# Patient Record
Sex: FEMALE | Race: WHITE | NOT HISPANIC OR LATINO | Employment: OTHER | URBAN - METROPOLITAN AREA
[De-identification: names, ages, dates, MRNs, and addresses within clinical notes are randomized per-mention and may not be internally consistent; named-entity substitution may affect disease eponyms.]

---

## 2017-05-05 ENCOUNTER — HOSPITAL ENCOUNTER (OUTPATIENT)
Dept: MAMMOGRAPHY | Facility: HOSPITAL | Age: 57
Discharge: HOME | End: 2017-05-05
Attending: OBSTETRICS & GYNECOLOGY | Admitting: OBSTETRICS & GYNECOLOGY

## 2017-05-15 ENCOUNTER — HOSPITAL ENCOUNTER (OUTPATIENT)
Dept: URGENT CARE | Facility: CLINIC | Age: 57
Discharge: HOME | End: 2017-05-15
Attending: FAMILY MEDICINE | Admitting: FAMILY MEDICINE

## 2018-05-18 ENCOUNTER — OFFICE (AMBULATORY)
Dept: URBAN - METROPOLITAN AREA PATHOLOGY 4 | Facility: PATHOLOGY | Age: 58
End: 2018-05-18
Payer: OTHER GOVERNMENT

## 2018-05-18 ENCOUNTER — ON CAMPUS - OUTPATIENT (AMBULATORY)
Dept: URBAN - METROPOLITAN AREA HOSPITAL 2 | Facility: HOSPITAL | Age: 58
End: 2018-05-18

## 2018-05-18 VITALS
SYSTOLIC BLOOD PRESSURE: 166 MMHG | SYSTOLIC BLOOD PRESSURE: 143 MMHG | OXYGEN SATURATION: 98 % | HEART RATE: 86 BPM | DIASTOLIC BLOOD PRESSURE: 97 MMHG | DIASTOLIC BLOOD PRESSURE: 75 MMHG | WEIGHT: 126 LBS | SYSTOLIC BLOOD PRESSURE: 128 MMHG | DIASTOLIC BLOOD PRESSURE: 111 MMHG | OXYGEN SATURATION: 97 % | HEIGHT: 60 IN | DIASTOLIC BLOOD PRESSURE: 98 MMHG | HEART RATE: 89 BPM | OXYGEN SATURATION: 99 % | DIASTOLIC BLOOD PRESSURE: 87 MMHG | OXYGEN SATURATION: 100 % | SYSTOLIC BLOOD PRESSURE: 116 MMHG | RESPIRATION RATE: 17 BRPM | RESPIRATION RATE: 14 BRPM | DIASTOLIC BLOOD PRESSURE: 61 MMHG | DIASTOLIC BLOOD PRESSURE: 91 MMHG | SYSTOLIC BLOOD PRESSURE: 158 MMHG | OXYGEN SATURATION: 92 % | TEMPERATURE: 97.4 F | HEART RATE: 94 BPM | RESPIRATION RATE: 16 BRPM | SYSTOLIC BLOOD PRESSURE: 161 MMHG | HEART RATE: 84 BPM | SYSTOLIC BLOOD PRESSURE: 147 MMHG | HEART RATE: 93 BPM | DIASTOLIC BLOOD PRESSURE: 83 MMHG | SYSTOLIC BLOOD PRESSURE: 139 MMHG | OXYGEN SATURATION: 93 % | RESPIRATION RATE: 18 BRPM | SYSTOLIC BLOOD PRESSURE: 178 MMHG | HEART RATE: 102 BPM

## 2018-05-18 DIAGNOSIS — K21.9 GASTRO-ESOPHAGEAL REFLUX DISEASE WITHOUT ESOPHAGITIS: ICD-10-CM

## 2018-05-18 DIAGNOSIS — K64.1 SECOND DEGREE HEMORRHOIDS: ICD-10-CM

## 2018-05-18 DIAGNOSIS — D12.3 BENIGN NEOPLASM OF TRANSVERSE COLON: ICD-10-CM

## 2018-05-18 DIAGNOSIS — Z12.11 ENCOUNTER FOR SCREENING FOR MALIGNANT NEOPLASM OF COLON: ICD-10-CM

## 2018-05-18 DIAGNOSIS — D12.2 BENIGN NEOPLASM OF ASCENDING COLON: ICD-10-CM

## 2018-05-18 DIAGNOSIS — K22.2 ESOPHAGEAL OBSTRUCTION: ICD-10-CM

## 2018-05-18 DIAGNOSIS — K44.9 DIAPHRAGMATIC HERNIA WITHOUT OBSTRUCTION OR GANGRENE: ICD-10-CM

## 2018-05-18 DIAGNOSIS — D12.5 BENIGN NEOPLASM OF SIGMOID COLON: ICD-10-CM

## 2018-05-18 LAB
GI HISTOLOGY: A. UNSPECIFIED: (no result)
GI HISTOLOGY: B. UNSPECIFIED: (no result)
GI HISTOLOGY: C. UNSPECIFIED: (no result)
GI HISTOLOGY: PDF REPORT: (no result)

## 2018-05-18 PROCEDURE — 43235 EGD DIAGNOSTIC BRUSH WASH: CPT | Mod: 59 | Performed by: INTERNAL MEDICINE

## 2018-05-18 PROCEDURE — 45385 COLONOSCOPY W/LESION REMOVAL: CPT | Mod: 33 | Performed by: INTERNAL MEDICINE

## 2018-05-18 PROCEDURE — 88305 TISSUE EXAM BY PATHOLOGIST: CPT | Mod: 33 | Performed by: INTERNAL MEDICINE

## 2018-05-18 RX ADMIN — PROPOFOL: 10 INJECTION, EMULSION INTRAVENOUS at 07:46

## 2018-05-24 ENCOUNTER — HOSPITAL ENCOUNTER (OUTPATIENT)
Dept: MAMMOGRAPHY | Facility: HOSPITAL | Age: 58
Discharge: HOME | End: 2018-05-24
Attending: OBSTETRICS & GYNECOLOGY | Admitting: OBSTETRICS & GYNECOLOGY

## 2018-06-11 ENCOUNTER — HOSPITAL ENCOUNTER (OUTPATIENT)
Dept: MAMMOGRAPHY | Facility: HOSPITAL | Age: 58
Discharge: HOME | End: 2018-06-11
Attending: OBSTETRICS & GYNECOLOGY | Admitting: OBSTETRICS & GYNECOLOGY

## 2019-08-01 ENCOUNTER — TRANSCRIBE ORDERS (OUTPATIENT)
Dept: ADMINISTRATIVE | Facility: HOSPITAL | Age: 59
End: 2019-08-01

## 2019-08-01 DIAGNOSIS — Z12.39 SCREENING BREAST EXAMINATION: Primary | ICD-10-CM

## 2019-10-04 ENCOUNTER — HOSPITAL ENCOUNTER (OUTPATIENT)
Dept: MAMMOGRAPHY | Facility: HOSPITAL | Age: 59
Discharge: HOME | End: 2019-10-04
Admitting: OBSTETRICS & GYNECOLOGY

## 2019-10-04 DIAGNOSIS — Z12.39 SCREENING BREAST EXAMINATION: ICD-10-CM

## 2019-10-04 PROCEDURE — 77067 SCR MAMMO BI INCL CAD: CPT

## 2019-10-04 PROCEDURE — 77063 BREAST TOMOSYNTHESIS BI: CPT

## 2019-11-06 RX ORDER — PANTOPRAZOLE SODIUM 40 MG/1
40 TABLET, DELAYED RELEASE ORAL DAILY
Qty: 14 TABLET | Refills: 0 | Status: SHIPPED | OUTPATIENT
Start: 2019-11-06 | End: 2019-11-12 | Stop reason: SDUPTHER

## 2019-11-06 RX ORDER — METOPROLOL SUCCINATE 25 MG/1
25 TABLET, EXTENDED RELEASE ORAL DAILY
Qty: 14 TABLET | Refills: 0 | Status: SHIPPED | OUTPATIENT
Start: 2019-11-06 | End: 2019-11-12 | Stop reason: SDUPTHER

## 2019-11-06 RX ORDER — GEMFIBROZIL 600 MG/1
600 TABLET, FILM COATED ORAL 2 TIMES DAILY
Qty: 28 TABLET | Refills: 0 | Status: SHIPPED | OUTPATIENT
Start: 2019-11-06 | End: 2019-11-12 | Stop reason: SDUPTHER

## 2019-11-06 RX ORDER — METOPROLOL SUCCINATE 25 MG/1
1 TABLET, EXTENDED RELEASE ORAL DAILY
Refills: 0 | COMMUNITY
Start: 2019-08-08 | End: 2019-11-06 | Stop reason: SDUPTHER

## 2019-11-06 RX ORDER — GEMFIBROZIL 600 MG/1
1 TABLET, FILM COATED ORAL 2 TIMES DAILY
Refills: 0 | COMMUNITY
Start: 2019-08-08 | End: 2019-11-06 | Stop reason: SDUPTHER

## 2019-11-06 RX ORDER — PANTOPRAZOLE SODIUM 40 MG/1
1 TABLET, DELAYED RELEASE ORAL DAILY
Refills: 0 | COMMUNITY
Start: 2019-08-08 | End: 2019-11-06 | Stop reason: SDUPTHER

## 2019-11-12 RX ORDER — GEMFIBROZIL 600 MG/1
600 TABLET, FILM COATED ORAL 2 TIMES DAILY
Qty: 60 TABLET | Refills: 3 | Status: SHIPPED | OUTPATIENT
Start: 2019-11-12 | End: 2020-03-12

## 2019-11-12 RX ORDER — METOPROLOL SUCCINATE 25 MG/1
25 TABLET, EXTENDED RELEASE ORAL DAILY
Qty: 30 TABLET | Refills: 3 | Status: SHIPPED | OUTPATIENT
Start: 2019-11-12 | End: 2020-03-12

## 2019-11-12 RX ORDER — PANTOPRAZOLE SODIUM 40 MG/1
40 TABLET, DELAYED RELEASE ORAL DAILY
Qty: 30 TABLET | Refills: 3 | Status: SHIPPED | OUTPATIENT
Start: 2019-11-12 | End: 2020-03-12

## 2019-12-26 RX ORDER — FOSINOPRIL SODIUM 20 MG/1
TABLET ORAL
Qty: 90 TABLET | Refills: 0 | Status: SHIPPED | OUTPATIENT
Start: 2019-12-26 | End: 2020-03-15

## 2020-02-06 ENCOUNTER — OFFICE VISIT (OUTPATIENT)
Dept: FAMILY MEDICINE CLINIC | Facility: CLINIC | Age: 60
End: 2020-02-06

## 2020-02-06 VITALS
DIASTOLIC BLOOD PRESSURE: 98 MMHG | TEMPERATURE: 98.2 F | HEART RATE: 93 BPM | WEIGHT: 134.8 LBS | HEIGHT: 61 IN | OXYGEN SATURATION: 97 % | BODY MASS INDEX: 25.45 KG/M2 | SYSTOLIC BLOOD PRESSURE: 160 MMHG

## 2020-02-06 DIAGNOSIS — Z00.00 PREVENTATIVE HEALTH CARE: ICD-10-CM

## 2020-02-06 DIAGNOSIS — J30.1 ALLERGIC RHINITIS DUE TO POLLEN, UNSPECIFIED SEASONALITY: ICD-10-CM

## 2020-02-06 DIAGNOSIS — J02.9 SORE THROAT: Primary | ICD-10-CM

## 2020-02-06 DIAGNOSIS — R73.01 FASTING HYPERGLYCEMIA: ICD-10-CM

## 2020-02-06 DIAGNOSIS — E78.5 DYSLIPIDEMIA: ICD-10-CM

## 2020-02-06 DIAGNOSIS — J01.01 ACUTE RECURRENT MAXILLARY SINUSITIS: ICD-10-CM

## 2020-02-06 DIAGNOSIS — R73.09 ELEVATED HEMOGLOBIN A1C: ICD-10-CM

## 2020-02-06 PROBLEM — J06.9 UPPER RESPIRATORY INFECTION, ACUTE: Status: ACTIVE | Noted: 2018-01-04

## 2020-02-06 PROBLEM — B34.9 NONSPECIFIC SYNDROME SUGGESTIVE OF VIRAL ILLNESS: Status: ACTIVE | Noted: 2019-02-15

## 2020-02-06 PROBLEM — I10 HYPERTENSION: Status: ACTIVE | Noted: 2020-02-06

## 2020-02-06 PROBLEM — Z13.1 SCREENING FOR DIABETES MELLITUS: Status: ACTIVE | Noted: 2018-01-04

## 2020-02-06 PROBLEM — M79.604 LEG PAIN, BILATERAL: Status: ACTIVE | Noted: 2018-10-17

## 2020-02-06 PROBLEM — R52 BODY ACHES: Status: ACTIVE | Noted: 2018-01-04

## 2020-02-06 PROBLEM — M79.642 HAND PAIN, LEFT: Status: ACTIVE | Noted: 2017-05-15

## 2020-02-06 PROBLEM — M79.605 LEG PAIN, BILATERAL: Status: ACTIVE | Noted: 2018-10-17

## 2020-02-06 PROBLEM — R05.9 COUGH: Status: ACTIVE | Noted: 2018-01-04

## 2020-02-06 PROBLEM — K21.9 GASTROESOPHAGEAL REFLUX DISEASE: Status: ACTIVE | Noted: 2020-02-06

## 2020-02-06 PROBLEM — J01.90 ACUTE SINUSITIS: Status: ACTIVE | Noted: 2018-01-04

## 2020-02-06 LAB
EXPIRATION DATE: NORMAL
INTERNAL CONTROL: NORMAL
Lab: NORMAL
S PYO AG THROAT QL: NEGATIVE

## 2020-02-06 PROCEDURE — 99213 OFFICE O/P EST LOW 20 MIN: CPT | Performed by: NURSE PRACTITIONER

## 2020-02-06 PROCEDURE — 87880 STREP A ASSAY W/OPTIC: CPT | Performed by: NURSE PRACTITIONER

## 2020-02-06 RX ORDER — PSEUDOEPHEDRINE HCL 30 MG
30 TABLET ORAL EVERY 4 HOURS PRN
Qty: 45 TABLET | Refills: 2 | Status: SHIPPED | OUTPATIENT
Start: 2020-02-06 | End: 2021-09-14

## 2020-02-06 RX ORDER — AZITHROMYCIN 250 MG/1
TABLET, FILM COATED ORAL
Qty: 6 TABLET | Refills: 0 | Status: SHIPPED | OUTPATIENT
Start: 2020-02-06 | End: 2020-04-22

## 2020-02-06 NOTE — PROGRESS NOTES
"    Nimco Mccracken is a 59 y.o. female.      58-year-old white female smoker with history of COPD, hypertension, carpal tunnel, GERD, anemia and anxiety who comes in with four-day history of sore throat I have pressure nose in the ears itching with cough.  Exam reveals severe allergic rhinitis and acute maxillary sinusitis I am placing her on allergy medication and antibiotics   patient had recent eye exam and that was within normal limits with exception of dry eyes   blood pressure 160/88 heart rate 92 she denies any chest pain, dyspnea, tachycardia or dizziness.  Patient states her blood pressure runs much lower at home and she is going to monitor it   weight is up 3 lb at 1:35 a.m.     Z-Tom   Sudafed 30 mg q.i.d. To home allergy meds   fasting blood work due       The following portions of the patient's history were reviewed and updated as appropriate: allergies, current medications, past family history, past medical history, past social history, past surgical history and problem list.    Vitals:    02/06/20 1352   BP: 160/98   BP Location: Left arm   Patient Position: Sitting   Cuff Size: Adult   Pulse: 93   Temp: 98.2 °F (36.8 °C)   TempSrc: Oral   SpO2: 97%   Weight: 61.1 kg (134 lb 12.8 oz)   Height: 154.9 cm (61\")     Body mass index is 25.47 kg/m².    Past Medical History:   Diagnosis Date   • Acute sinusitis    • Acute upper respiratory infection    • Allergic rhinitis due to pollen    • Anxiety    • Body aches    • Carpal tunnel syndrome    • Cigarette smoker    • Cough    • Dysconjugate gaze    • Dysfunction of eustachian tube    • Dyslipidemia    • Dysmenorrhea    • Fasting hyperglycemia    • GERD (gastroesophageal reflux disease)    • Hand pain, left    • Hemorrhoids    • Hx of abnormal cervical Pap smear    • Hypertension    • Irregular menses    • Leg pain, bilateral    • Nodule of upper lobe of right lung     BENIGN   • Paresthesia of both hands    • Post-menopausal    • RAD (reactive airway " disease)    • Skin lesions 06/2007    B9,L BACK,L HIP   • Strabismus    • Trigger finger of right thumb    • Vertigo    • Viral syndrome      Past Surgical History:   Procedure Laterality Date   • CARPAL TUNNEL RELEASE Right 08/2014   • COLONOSCOPY     • ENDOSCOPY  07/21/2006    DR [facility] B9;DR LOZANO     Family History   Problem Relation Age of Onset   • Breast cancer Mother    • Breast cancer Daughter    • Diabetes Other    • Heart disease Other    • Dementia Other    • COPD Other      Immunization History   Administered Date(s) Administered   • Pneumococcal Polysaccharide (PPSV23) 02/21/2014       Conversion Encounter on 05/14/2016   Component Date Value Ref Range Status   • Albumin 05/14/2016 4.6  3.6 - 5.1 g/dL Final   • Alkaline Phosphatase 05/14/2016 82  33 - 130 units/L Final   • Basophils Absolute 05/14/2016 48 CELLS/UL  0 - 200 10*3/mm3 Final   • Basophil Rel % 05/14/2016 0.5  % Final   • Total Bilirubin 05/14/2016 0.4  0.2 - 1.2 mg/dL Final   • BUN 05/14/2016 10  7 - 25 mg/dL Final   • BUN/Creatinine Ratio 05/14/2016 NOT APPLICABLE (calc)  6 - 22 Final   • Calcium 05/14/2016 10.1  8.6 - 10.4 mg/dL Final   • Chloride 05/14/2016 105  98 - 110 mmol/L Final   • Chol/HDL Ratio 05/14/2016 4.5 (calc)  < OR = 5.0 Final   • Total Cholesterol 05/14/2016 203* 125 - 200 mg/dL Final   • CO2 CONTENT  05/14/2016 26  19 - 30 mmol/L Final   • Creatinine 05/14/2016 0.67  0.50 - 1.05 mg/dL Final   • eGFR African Am 05/14/2016 100  > OR = 60 mL/min/1.73m2 Final   • eGFR Non  Am 05/14/2016 116  > OR = 60 mL/min/1.73m2 Final   • Eosinophils Absolute 05/14/2016 115 CELLS/UL  15 - 500 10*3/mm3 Final   • Eosinophil Rel % 05/14/2016 1.2  % Final   • Globulin 05/14/2016 2.9 G/DL (CALC)  1.9 - 3.7 g/dL Final   • Glucose 05/14/2016 91  65 - 99 mg/dL Final   • Hematocrit 05/14/2016 41.2  35.0 - 45.0 % Final   • HDL Cholesterol 05/14/2016 45* > OR = 46 mg/dL Final   • Hemoglobin 05/14/2016 13.7  11.7 - 15.5 g/dL Final    • LDL Cholesterol  05/14/2016 122 MG/DL (CALC)  <130 mg/dL Final   • Lymphocytes Absolute 05/14/2016 3830 CELLS/UL  850 - 3900 10*3/mm3 Final   • Lymphocyte Rel % 05/14/2016 39.9  % Final   • MCH 05/14/2016 29.4  27.0 - 33.0 pg Final   • MCHC 05/14/2016 33.2 G/DL  32.0 - 36.0 % Final   • MCV 05/14/2016 88.5  80.0 - 100.0 fL Final   • Monocyte Rel % 05/14/2016 4.7  % Final   • Monocytes Absolute 05/14/2016 451 CELLS/UL  200 - 950 10*3/microliter Final   • MPV 05/14/2016 7.9  7.5 - 11.5 fL Final   • Neutrophils Absolute 05/14/2016 5155 CELLS/UL  1500 - 7800 10*3/mm3 Final   • Platelets 05/14/2016 311 THOUSAND/UL  140 - 400 10*3/mm3 Final   • Neutrophils, Fluid 05/14/2016 53.7  % Final   • Potassium 05/14/2016 4.2  3.5 - 5.3 mmol/L Final   • Total Protein 05/14/2016 7.5  6.1 - 8.1 g/dL Final   • RBC 05/14/2016 4.66 MILLION/UL  3.80 - 5.10 10*6/mm3 Final   • RDW 05/14/2016 13.6  11.0 - 15.0 % Final   • AST (SGOT) 05/14/2016 14  10 - 35 units/L Final   • ALT (SGPT) 05/14/2016 13  6 - 29 units/L Final   • Sodium 05/14/2016 139  135 - 146 mmol/L Final   • Triglycerides 05/14/2016 181* <150 mg/dL Final   • WBC 05/14/2016 9.6 THOUSAND/UL  3.8 - 10.8 K/uL Final   • A/G Ratio 05/14/2016 1.6 (calc)  1.0 - 2.5 Final   • Albumin 05/20/2017 4.3  3.6 - 5.1 g/dL Final   • Alkaline Phosphatase 05/20/2017 77  33 - 130 units/L Final   • Vitamin B-12 05/20/2017 334  200 - 1100 pg/mL Final   • Basophils Absolute 05/20/2017 26 CELLS/UL  0 - 200 10*3/mm3 Final   • Basophil Rel % 05/20/2017 0.2  % Final   • Total Bilirubin 05/20/2017 0.3  0.2 - 1.2 mg/dL Final   • BUN 05/20/2017 16  7 - 25 mg/dL Final   • BUN/Creatinine Ratio 05/20/2017 NOT APPLICABLE (calc)  6 - 22 Final   • Calcium 05/20/2017 9.9  8.6 - 10.4 mg/dL Final   • Chloride 05/20/2017 106  98 - 110 mmol/L Final   • Chol/HDL Ratio 05/20/2017 3.7 (calc)  < OR = 5.0 Final   • Total Cholesterol 05/20/2017 163  125 - 200 mg/dL Final   • CO2 CONTENT  05/20/2017 26  20 - 31 mmol/L  Final   • Creatinine 05/20/2017 0.64  0.50 - 1.05 mg/dL Final   • eGFR African Am 05/20/2017 101  > OR = 60 mL/min/1.73m2 Final   • eGFR Non  Am 05/20/2017 117  > OR = 60 mL/min/1.73m2 Final   • Eosinophils Absolute 05/20/2017 39 CELLS/UL  15 - 500 10*3/mm3 Final   • Eosinophil Rel % 05/20/2017 0.3  % Final   • Globulin 05/20/2017 2.5 G/DL (CALC)  1.9 - 3.7 g/dL Final   • Glucose 05/20/2017 80  65 - 99 mg/dL Final   • Hematocrit 05/20/2017 38.4  35.0 - 45.0 % Final   • HDL Cholesterol 05/20/2017 44* > OR = 46 mg/dL Final   • Hemoglobin 05/20/2017 12.6  11.7 - 15.5 g/dL Final   • LDL Cholesterol  05/20/2017 78 MG/DL (CALC)  <130 mg/dL Final   • Lymphocytes Absolute 05/20/2017 5746 CELLS/UL* 850 - 3900 10*3/mm3 Final   • Lymphocyte Rel % 05/20/2017 44.2  % Final   • MCH 05/20/2017 28.9  27.0 - 33.0 pg Final   • MCHC 05/20/2017 32.8 G/DL  32.0 - 36.0 % Final   • MCV 05/20/2017 88.1  80.0 - 100.0 fL Final   • Monocyte Rel % 05/20/2017 5.4  % Final   • Monocytes Absolute 05/20/2017 702 CELLS/UL  200 - 950 10*3/microliter Final   • MPV 05/20/2017 10.0  7.5 - 12.5 fL Final   • Neutrophils Absolute 05/20/2017 6487 CELLS/UL  1500 - 7800 10*3/mm3 Final   • Platelets 05/20/2017 355 THOUSAND/UL  140 - 400 10*3/mm3 Final   • Neutrophils, Fluid 05/20/2017 49.9  % Final   • Potassium 05/20/2017 3.9  3.5 - 5.3 mmol/L Final   • Total Protein 05/20/2017 6.8  6.1 - 8.1 g/dL Final   • RBC 05/20/2017 4.36 MILLION/UL  3.80 - 5.10 10*6/mm3 Final   • RDW 05/20/2017 12.6  11.0 - 15.0 % Final   • AST (SGOT) 05/20/2017 12  10 - 35 units/L Final   • ALT (SGPT) 05/20/2017 10  6 - 29 units/L Final   • Sodium 05/20/2017 140  135 - 146 mmol/L Final   • Triglycerides 05/20/2017 206* <150 mg/dL Final   • TSH 05/20/2017 1.58  mIU/L Final   • WBC 05/20/2017 13.0 THOUSAND/UL* 3.8 - 10.8 K/uL Final   • A/G Ratio 05/20/2017 1.7 (calc)  1.0 - 2.5 Final   • Albumin 01/19/2018 4.2  3.6 - 5.1 g/dL Final   • Alkaline Phosphatase 01/19/2018 80  33 - 130  units/L Final   • Basophils Absolute 01/19/2018 100 CELLS/UL  0 - 200 10*3/mm3 Final   • Basophil Rel % 01/19/2018 1  % Final   • Glucose 01/19/2018 105* 65 - 99 mg/dL Final   • Total Bilirubin 01/19/2018 0.6  0.2 - 1.2 mg/dL Final   • BUN 01/19/2018 5* 7 - 25 mg/dL Final   • BUN/Creatinine Ratio 01/19/2018 7.1 (calc)  6 - 22 Final   • Calcium 01/19/2018 10.1  8.6 - 10.2 mg/dL Final   • Chloride 01/19/2018 105  98 - 110 mmol/L Final   • Chol/HDL Ratio 01/19/2018 3.9 (calc)  <5.1 Final   • Total Cholesterol 01/19/2018 193  125 - 200 mg/dL Final   • CO2 01/19/2018 26  21 - 33 mmol/L Final   • Creatinine 01/19/2018 0.7  0.60 - 1.10 mg/dL Final   • eGFR African Am 01/19/2018 >60 mL/min/1.73m2  >59 mL/min/1.73m2 Final   • eGFR Non African Am 01/19/2018 >60 mL/min/1.73m2  >59 mL/min/1.73m2 Final   • Eosinophils Absolute 01/19/2018 100 CELLS/UL  15 - 500 10*3/mm3 Final   • Eosinophil Rel % 01/19/2018 1  % Final   • Globulin 01/19/2018 2.8 G/DL (CALC)  2.2 - 3.9 g/dL Final   • Hematocrit 01/19/2018 41.6  35.0 - 45.0 % Final   • HDL Cholesterol 01/19/2018 50  >46 mg/dL Final   • Hemoglobin 01/19/2018 14.0  11.7 - 15.5 g/dL Final   • % Hgb A1C 01/19/2018 6.3 % OF TOTAL HGB* <5.7 % Final   • LDL Cholesterol  01/19/2018 113  <130 mg/dL Final   • Lymphocytes Absolute 01/19/2018 3100 CELLS/UL  850 - 3900 10*3/mm3 Final   • Lymphocyte Rel % 01/19/2018 33  % Final   • MCH 01/19/2018 30.1  27.0 - 33.0 pg Final   • MCHC 01/19/2018 33.6 G/DL  32.0 - 36.0 % Final   • MCV 01/19/2018 89.7  80.0 - 100.0 fL Final   • Monocyte Rel % 01/19/2018 5  % Final   • Monocytes Absolute 01/19/2018 500 CELLS/UL  200 - 950 10*3/microliter Final   • MPV 01/19/2018 8.3  7.5 - 11.5 fL Final   • Neutrophils Absolute 01/19/2018 5600 CELLS/UL  1500 - 7800 10*3/mm3 Final   • Platelets 01/19/2018 339 THOUSAND/UL  140 - 400 10*3/mm3 Final   • Neutrophils, Fluid 01/19/2018 60  % Final   • Potassium 01/19/2018 5.0  3.5 - 5.3 mmol/L Final   • Total Protein  01/19/2018 7.0  6.2 - 8.3 g/dL Final   • RBC 01/19/2018 4.64 MILLION/UL  3.80 - 5.10 10*6/mm3 Final   • RDW 01/19/2018 13.2  11.0 - 15.0 % Final   • AST (SGOT) 01/19/2018 16  10 - 35 units/L Final   • ALT (SGPT) 01/19/2018 15  6 - 40 units/L Final   • Sodium 01/19/2018 139  135 - 146 mmol/L Final   • Triglycerides 01/19/2018 150* <150 mg/dL Final   • WBC 01/19/2018 9.3 THOUSAND/UL  3.8 - 10.8 10*3/mm3 Final   • A/G Ratio 01/19/2018 1.5 (calc)  1.0 - 2.1 Final   • Albumin 03/08/2019 4.6  3.6 - 5.1 g/dL Final   • Alkaline Phosphatase 03/08/2019 102  33 - 130 units/L Final   • Basophils Absolute 03/08/2019 41 CELLS/UL  0 - 200 10*3/mm3 Final   • Basophil Rel % 03/08/2019 0.5  % Final   • Total Bilirubin 03/08/2019 0.4  0.2 - 1.2 mg/dL Final   • BUN 03/08/2019 11  7 - 25 mg/dL Final   • BUN/Creatinine Ratio 03/08/2019 NOT APPLICABLE (calc)  6 - 22 Final   • Calcium 03/08/2019 9.9  8.6 - 10.4 mg/dL Final   • Chloride 03/08/2019 107  98 - 110 mmol/L Final   • Chol/HDL Ratio 03/08/2019 4.1 (calc)  <5.0 Final   • Total Cholesterol 03/08/2019 215* <200 mg/dL Final   • CO2 CONTENT  03/08/2019 24  20 - 32 mmol/L Final   • Creatinine 03/08/2019 0.69  0.50 - 1.05 mg/dL Final   • eGFR African Am 03/08/2019 97  > OR = 60 mL/min/1.73m2 Final   • eGFR Non African Am 03/08/2019 113  > OR = 60 mL/min/1.73m2 Final   • Eosinophils Absolute 03/08/2019 148 CELLS/UL  15 - 500 10*3/mm3 Final   • Eosinophil Rel % 03/08/2019 1.8  % Final   • Globulin 03/08/2019 2.7 G/DL (CALC)  1.9 - 3.7 g/dL Final   • Glucose 03/08/2019 85  65 - 99 mg/dL Final   • Hematocrit 03/08/2019 36.0  35.0 - 45.0 % Final   • HDL Cholesterol 03/08/2019 53  >50 mg/dL Final   • Hemoglobin 03/08/2019 12.4  11.7 - 15.5 g/dL Final   • % Hgb A1C 03/08/2019 6.0 % OF TOTAL HGB* <5.7 % Final   • LDL Cholesterol  03/08/2019 147 MG/DL (CALC)* mg/dL Final   • Lymphocytes Absolute 03/08/2019 3862 CELLS/UL  850 - 3900 10*3/mm3 Final   • Lymphocyte Rel % 03/08/2019 47.1  % Final   •  MCH 03/08/2019 29.6  27.0 - 33.0 pg Final   • MCHC 03/08/2019 34.4 G/DL  32.0 - 36.0 % Final   • MCV 03/08/2019 85.9  80.0 - 100.0 fL Final   • Monocyte Rel % 03/08/2019 7.2  % Final   • Monocytes Absolute 03/08/2019 590 CELLS/UL  200 - 950 10*3/microliter Final   • MPV 03/08/2019 10.3  7.5 - 12.5 fL Final   • Neutrophils Absolute 03/08/2019 3559 CELLS/UL  1500 - 7800 10*3/mm3 Final   • Platelets 03/08/2019 356 THOUSAND/UL  140 - 400 10*3/mm3 Final   • Neutrophils, Fluid 03/08/2019 43.4  % Final   • Potassium 03/08/2019 4.3  3.5 - 5.3 mmol/L Final   • Total Protein 03/08/2019 7.3  6.1 - 8.1 g/dL Final   • RBC 03/08/2019 4.19 MILLION/UL  3.80 - 5.10 10*6/mm3 Final   • RDW 03/08/2019 12.9  11.0 - 15.0 % Final   • AST (SGOT) 03/08/2019 16  10 - 35 units/L Final   • ALT (SGPT) 03/08/2019 14  6 - 29 units/L Final   • Sodium 03/08/2019 139  135 - 146 mmol/L Final   • Triglycerides 03/08/2019 60  <150 mg/dL Final   • WBC 03/08/2019 8.2 THOUSAND/UL  3.8 - 10.8 K/uL Final   • A/G Ratio 03/08/2019 1.7 (calc)  1.0 - 2.5 Final         Review of Systems   Constitutional: Positive for fatigue.   HENT: Positive for postnasal drip, rhinorrhea, sinus pressure and sore throat.    Respiratory: Positive for cough.    Cardiovascular: Negative.    Gastrointestinal: Negative.    Genitourinary: Negative.    Musculoskeletal: Negative.    Skin: Negative.    Neurological: Negative.    Psychiatric/Behavioral: Negative.        Objective   Physical Exam   Constitutional: She is oriented to person, place, and time. She appears well-developed and well-nourished.   HENT:   Heavy postnasal drip with swollen red terminates bulging TMs and maxillary sinus pressure   Cardiovascular: Normal rate and regular rhythm.   Pulmonary/Chest: Effort normal and breath sounds normal.   Abdominal: Soft. Bowel sounds are normal.   Musculoskeletal: Normal range of motion.   Neurological: She is alert and oriented to person, place, and time.   Skin: Skin is warm and  dry.   Psychiatric: She has a normal mood and affect.       Procedures    Assessment/Plan   Nimco was seen today for sore throat.    Diagnoses and all orders for this visit:    Sore throat  -     POCT rapid strep A    Fasting hyperglycemia  -     Comprehensive Metabolic Panel    Dyslipidemia  -     Lipid Panel With LDL / HDL Ratio    Preventative health care  -     CBC & Differential    Elevated hemoglobin A1c  -     Hemoglobin A1c    Acute recurrent maxillary sinusitis    Allergic rhinitis due to pollen, unspecified seasonality    Other orders  -     azithromycin (ZITHROMAX Z-LUAN) 250 MG tablet; Take 2 tablets the first day, then 1 tablet daily for 4 days.  -     pseudoephedrine (SUDAFED) 30 MG tablet; Take 1 tablet by mouth Every 4 (Four) Hours As Needed for Congestion.          Current Outpatient Medications:   •  fosinopril (MONOPRIL) 20 MG tablet, take 1 tablet by mouth once daily, Disp: 90 tablet, Rfl: 0  •  gemfibrozil (LOPID) 600 MG tablet, Take 1 tablet by mouth 2 (Two) Times a Day., Disp: 60 tablet, Rfl: 3  •  metoprolol succinate XL (TOPROL-XL) 25 MG 24 hr tablet, Take 1 tablet by mouth Daily., Disp: 30 tablet, Rfl: 3  •  pantoprazole (PROTONIX) 40 MG EC tablet, Take 1 tablet by mouth Daily., Disp: 30 tablet, Rfl: 3  •  azithromycin (ZITHROMAX Z-LUAN) 250 MG tablet, Take 2 tablets the first day, then 1 tablet daily for 4 days., Disp: 6 tablet, Rfl: 0  •  pseudoephedrine (SUDAFED) 30 MG tablet, Take 1 tablet by mouth Every 4 (Four) Hours As Needed for Congestion., Disp: 45 tablet, Rfl: 2

## 2020-03-12 RX ORDER — PANTOPRAZOLE SODIUM 40 MG/1
TABLET, DELAYED RELEASE ORAL
Qty: 30 TABLET | Refills: 3 | Status: SHIPPED | OUTPATIENT
Start: 2020-03-12 | End: 2020-08-03

## 2020-03-12 RX ORDER — GEMFIBROZIL 600 MG/1
TABLET, FILM COATED ORAL
Qty: 60 TABLET | Refills: 3 | Status: SHIPPED | OUTPATIENT
Start: 2020-03-12 | End: 2020-08-03

## 2020-03-12 RX ORDER — METOPROLOL SUCCINATE 25 MG/1
TABLET, EXTENDED RELEASE ORAL
Qty: 30 TABLET | Refills: 3 | Status: SHIPPED | OUTPATIENT
Start: 2020-03-12 | End: 2020-07-07

## 2020-03-14 LAB
ALBUMIN SERPL-MCNC: 4.9 G/DL (ref 3.8–4.9)
ALBUMIN/GLOB SERPL: 1.8 {RATIO} (ref 1.2–2.2)
ALP SERPL-CCNC: 110 IU/L (ref 39–117)
ALT SERPL-CCNC: 12 IU/L (ref 0–32)
AST SERPL-CCNC: 15 IU/L (ref 0–40)
BASOPHILS # BLD AUTO: 0.1 X10E3/UL (ref 0–0.2)
BASOPHILS NFR BLD AUTO: 1 %
BILIRUB SERPL-MCNC: 0.3 MG/DL (ref 0–1.2)
BUN SERPL-MCNC: 15 MG/DL (ref 6–24)
BUN/CREAT SERPL: 16 (ref 9–23)
CALCIUM SERPL-MCNC: 10.3 MG/DL (ref 8.7–10.2)
CHLORIDE SERPL-SCNC: 105 MMOL/L (ref 96–106)
CHOLEST SERPL-MCNC: 212 MG/DL (ref 100–199)
CO2 SERPL-SCNC: 24 MMOL/L (ref 20–29)
CREAT SERPL-MCNC: 0.92 MG/DL (ref 0.57–1)
EOSINOPHIL # BLD AUTO: 0.2 X10E3/UL (ref 0–0.4)
EOSINOPHIL NFR BLD AUTO: 2 %
ERYTHROCYTE [DISTWIDTH] IN BLOOD BY AUTOMATED COUNT: 12.8 % (ref 11.7–15.4)
GLOBULIN SER CALC-MCNC: 2.7 G/DL (ref 1.5–4.5)
GLUCOSE SERPL-MCNC: 88 MG/DL (ref 65–99)
HBA1C MFR BLD: 6.1 % (ref 4.8–5.6)
HCT VFR BLD AUTO: 41.8 % (ref 34–46.6)
HDLC SERPL-MCNC: 56 MG/DL
HGB BLD-MCNC: 13.9 G/DL (ref 11.1–15.9)
IMM GRANULOCYTES # BLD AUTO: 0 X10E3/UL (ref 0–0.1)
IMM GRANULOCYTES NFR BLD AUTO: 0 %
LDLC SERPL CALC-MCNC: 148 MG/DL (ref 0–99)
LDLC/HDLC SERPL: 2.6 RATIO (ref 0–3.2)
LYMPHOCYTES # BLD AUTO: 3 X10E3/UL (ref 0.7–3.1)
LYMPHOCYTES NFR BLD AUTO: 39 %
MCH RBC QN AUTO: 29.4 PG (ref 26.6–33)
MCHC RBC AUTO-ENTMCNC: 33.3 G/DL (ref 31.5–35.7)
MCV RBC AUTO: 88 FL (ref 79–97)
MONOCYTES # BLD AUTO: 0.6 X10E3/UL (ref 0.1–0.9)
MONOCYTES NFR BLD AUTO: 8 %
NEUTROPHILS # BLD AUTO: 3.9 X10E3/UL (ref 1.4–7)
NEUTROPHILS NFR BLD AUTO: 50 %
PLATELET # BLD AUTO: 472 X10E3/UL (ref 150–450)
POTASSIUM SERPL-SCNC: 4.6 MMOL/L (ref 3.5–5.2)
PROT SERPL-MCNC: 7.6 G/DL (ref 6–8.5)
RBC # BLD AUTO: 4.73 X10E6/UL (ref 3.77–5.28)
SODIUM SERPL-SCNC: 145 MMOL/L (ref 134–144)
TRIGL SERPL-MCNC: 41 MG/DL (ref 0–149)
VLDLC SERPL CALC-MCNC: 8 MG/DL (ref 5–40)
WBC # BLD AUTO: 7.7 X10E3/UL (ref 3.4–10.8)

## 2020-03-15 RX ORDER — FOSINOPRIL SODIUM 20 MG/1
TABLET ORAL
Qty: 90 TABLET | Refills: 0 | Status: SHIPPED | OUTPATIENT
Start: 2020-03-15 | End: 2020-03-30

## 2020-03-30 RX ORDER — FOSINOPRIL SODIUM 20 MG/1
TABLET ORAL
Qty: 90 TABLET | Refills: 0 | Status: SHIPPED | OUTPATIENT
Start: 2020-03-30 | End: 2020-04-01 | Stop reason: SDUPTHER

## 2020-04-01 RX ORDER — FOSINOPRIL SODIUM 20 MG/1
20 TABLET ORAL DAILY
Qty: 90 TABLET | Refills: 2 | Status: SHIPPED | OUTPATIENT
Start: 2020-04-01 | End: 2020-08-24 | Stop reason: SDUPTHER

## 2020-04-20 ENCOUNTER — TELEPHONE (OUTPATIENT)
Dept: FAMILY MEDICINE CLINIC | Facility: CLINIC | Age: 60
End: 2020-04-20

## 2020-04-20 NOTE — TELEPHONE ENCOUNTER
Pt called and said she is really worried about going back to work.  Its making her anxiety go crazy.  She works at [employer] in [place].  She is just panicking about going abck.  Didn't know if there was something she could do or what you thought.      I told pt there was not much we could do but I would talk to you about it.  SG

## 2020-04-22 ENCOUNTER — TELEMEDICINE (OUTPATIENT)
Dept: FAMILY MEDICINE CLINIC | Facility: CLINIC | Age: 60
End: 2020-04-22

## 2020-04-22 DIAGNOSIS — F17.210 CIGARETTE SMOKER: ICD-10-CM

## 2020-04-22 DIAGNOSIS — F41.9 ANXIETY: ICD-10-CM

## 2020-04-22 DIAGNOSIS — I10 ESSENTIAL HYPERTENSION: Primary | ICD-10-CM

## 2020-04-22 DIAGNOSIS — J68.3 REACTIVE AIRWAYS DYSFUNCTION SYNDROME (HCC): ICD-10-CM

## 2020-04-22 PROCEDURE — 99213 OFFICE O/P EST LOW 20 MIN: CPT | Performed by: NURSE PRACTITIONER

## 2020-04-22 NOTE — PROGRESS NOTES
Nimco Mccracken is a 59 y.o. female.     You have chosen to receive care through a telehealth visit.  Do you consent to use a video/audio connection for your medical care today? Yes    59-year-old obese white female smoker with history of COPD, hypertension, carpal tunnel, GERD, anemia and anxiety with elevated A1c's who calls in today for video visit.  Patient's main reason for call is anxiety about going back to work with her history of COPD hypertension and borderline  diabetes.  I agreed to write her a note asking the employer to allow her to stay home due to her medical conditions however cannot guarantee that they will honor it.  Patient is not sure if she would lose her job if she continues to refuse to go back to work.  We discussed possibly going on Lexapro or Zoloft if patient does return to work  Patient knows her blood pressure is elevated it was 160/88 last time she was here however she cannot get a cuff to work and she is going to try to find a way to monitor her blood pressures and call the office.  She denies any chest pain, dyspnea, tachycardia or dizziness  Patient's last A1c was 6.1 with a glucose fasting of 90 and both of her siblings are diabetics.  Patient admits to eating a lot of sweets since the quarantine out of anxiety and boredom and we agreed to do fasting blood work in June with her next appointment.  Patient does not check blood sugars at home  Patient colonoscopy coming due next year she is up-to-date on eye exams and we will schedule her mammogram and DEXA scan in October of this year.  She goes to OB/GYN for her Pap smears    Work note  Fasting office visit here in June  Schedule mammogram and DEXA scan this fall  Make appointment with OB/GYN for Pap smear  Stay on diet as discussed  Monitor blood pressures             The following portions of the patient's history were reviewed and updated as appropriate: allergies, current medications, past family history, past medical  history, past social history, past surgical history and problem list.    There were no vitals filed for this visit.  There is no height or weight on file to calculate BMI.    Past Medical History:   Diagnosis Date   • Acute sinusitis    • Acute upper respiratory infection    • Allergic rhinitis due to pollen    • Anxiety    • Body aches    • Carpal tunnel syndrome    • Cigarette smoker    • Cough    • Dysconjugate gaze    • Dysfunction of eustachian tube    • Dyslipidemia    • Dysmenorrhea    • Fasting hyperglycemia    • GERD (gastroesophageal reflux disease)    • Hand pain, left    • Hemorrhoids    • Hx of abnormal cervical Pap smear    • Hypertension    • Irregular menses    • Leg pain, bilateral    • Nodule of upper lobe of right lung     BENIGN   • Paresthesia of both hands    • Post-menopausal    • RAD (reactive airway disease)    • Skin lesions 06/2007    B9,L BACK,L HIP   • Strabismus    • Trigger finger of right thumb    • Vertigo    • Viral syndrome      Past Surgical History:   Procedure Laterality Date   • CARPAL TUNNEL RELEASE Right 08/2014   • COLONOSCOPY     • ENDOSCOPY  07/21/2006    DR [facility] B9;DR LOZANO     Family History   Problem Relation Age of Onset   • Breast cancer Mother    • Breast cancer Daughter    • Diabetes Other    • Heart disease Other    • Dementia Other    • COPD Other      Immunization History   Administered Date(s) Administered   • Pneumococcal Polysaccharide (PPSV23) 02/21/2014       Office Visit on 02/06/2020   Component Date Value Ref Range Status   • Rapid Strep A Screen 02/06/2020 Negative  Negative, VALID, INVALID, Not Performed Final   • Internal Control 02/06/2020 Passed  Passed Final   • Lot Number 02/06/2020 [id number]   Final   • Expiration Date 02/06/2020 06/30/2020   Final   • WBC 03/13/2020 7.7  3.4 - 10.8 x10E3/uL Final   • RBC 03/13/2020 4.73  3.77 - 5.28 x10E6/uL Final   • Hemoglobin 03/13/2020 13.9  11.1 - 15.9 g/dL Final   • Hematocrit 03/13/2020 41.8   34.0 - 46.6 % Final   • MCV 03/13/2020 88  79 - 97 fL Final   • MCH 03/13/2020 29.4  26.6 - 33.0 pg Final   • MCHC 03/13/2020 33.3  31.5 - 35.7 g/dL Final   • RDW 03/13/2020 12.8  11.7 - 15.4 % Final   • Platelets 03/13/2020 472* 150 - 450 x10E3/uL Final   • Neutrophil Rel % 03/13/2020 50  Not Estab. % Final   • Lymphocyte Rel % 03/13/2020 39  Not Estab. % Final   • Monocyte Rel % 03/13/2020 8  Not Estab. % Final   • Eosinophil Rel % 03/13/2020 2  Not Estab. % Final   • Basophil Rel % 03/13/2020 1  Not Estab. % Final   • Neutrophils Absolute 03/13/2020 3.9  1.4 - 7.0 x10E3/uL Final   • Lymphocytes Absolute 03/13/2020 3.0  0.7 - 3.1 x10E3/uL Final   • Monocytes Absolute 03/13/2020 0.6  0.1 - 0.9 x10E3/uL Final   • Eosinophils Absolute 03/13/2020 0.2  0.0 - 0.4 x10E3/uL Final   • Basophils Absolute 03/13/2020 0.1  0.0 - 0.2 x10E3/uL Final   • Immature Granulocyte Rel % 03/13/2020 0  Not Estab. % Final   • Immature Grans Absolute 03/13/2020 0.0  0.0 - 0.1 x10E3/uL Final   • Total Cholesterol 03/13/2020 212* 100 - 199 mg/dL Final   • Triglycerides 03/13/2020 41  0 - 149 mg/dL Final   • HDL Cholesterol 03/13/2020 56  >39 mg/dL Final   • VLDL Cholesterol 03/13/2020 8  5 - 40 mg/dL Final   • LDL Cholesterol  03/13/2020 148* 0 - 99 mg/dL Final   • LDL/HDL Ratio 03/13/2020 2.6  0.0 - 3.2 ratio Final    Comment:                                     LDL/HDL Ratio                                              Men  Women                                1/2 Avg.Risk  1.0    1.5                                    Avg.Risk  3.6    3.2                                 2X Avg.Risk  6.2    5.0                                 3X Avg.Risk  8.0    6.1     • Glucose 03/13/2020 88  65 - 99 mg/dL Final   • BUN 03/13/2020 15  6 - 24 mg/dL Final   • Creatinine 03/13/2020 0.92  0.57 - 1.00 mg/dL Final   • eGFR Non African Am 03/13/2020 69  >59 mL/min/1.73 Final   • eGFR African Am 03/13/2020 80  >59 mL/min/1.73 Final   • BUN/Creatinine Ratio  03/13/2020 16  9 - 23 Final   • Sodium 03/13/2020 145* 134 - 144 mmol/L Final   • Potassium 03/13/2020 4.6  3.5 - 5.2 mmol/L Final   • Chloride 03/13/2020 105  96 - 106 mmol/L Final   • Total CO2 03/13/2020 24  20 - 29 mmol/L Final   • Calcium 03/13/2020 10.3* 8.7 - 10.2 mg/dL Final   • Total Protein 03/13/2020 7.6  6.0 - 8.5 g/dL Final   • Albumin 03/13/2020 4.9  3.8 - 4.9 g/dL Final   • Globulin 03/13/2020 2.7  1.5 - 4.5 g/dL Final   • A/G Ratio 03/13/2020 1.8  1.2 - 2.2 Final   • Total Bilirubin 03/13/2020 0.3  0.0 - 1.2 mg/dL Final   • Alkaline Phosphatase 03/13/2020 110  39 - 117 IU/L Final   • AST (SGOT) 03/13/2020 15  0 - 40 IU/L Final   • ALT (SGPT) 03/13/2020 12  0 - 32 IU/L Final   • Hemoglobin A1C 03/13/2020 6.1* 4.8 - 5.6 % Final    Comment:          Prediabetes: 5.7 - 6.4           Diabetes: >6.4           Glycemic control for adults with diabetes: <7.0           Review of Systems   Constitutional: Negative.    HENT: Negative.    Respiratory: Negative.    Cardiovascular: Negative.    Gastrointestinal: Negative.    Genitourinary: Negative.    Musculoskeletal: Negative.    Skin: Negative.    Neurological: Negative.    Psychiatric/Behavioral: The patient is nervous/anxious.        Objective   Physical Exam   Constitutional: She is oriented to person, place, and time. She appears well-developed and well-nourished.   Cardiovascular: Normal rate and regular rhythm.   Per patient   Pulmonary/Chest: Effort normal.   Per patient   Abdominal: Soft. Bowel sounds are normal.   Musculoskeletal: Normal range of motion.   Neurological: She is oriented to person, place, and time.   Skin: Skin is warm.   Psychiatric:   Increased anxiety over going back to work due to medical conditions       Procedures    Assessment/Plan   Nimco was seen today for anxiety.    Diagnoses and all orders for this visit:    Essential hypertension    Anxiety    Cigarette smoker    Reactive airways dysfunction syndrome  (CMS/[facility])          Current Outpatient Medications:   •  fosinopril (MONOPRIL) 20 MG tablet, Take 1 tablet by mouth Daily., Disp: 90 tablet, Rfl: 2  •  gemfibrozil (LOPID) 600 MG tablet, TAKE 1 TABLET BY MOUTH TWICE A DAY, Disp: 60 tablet, Rfl: 3  •  metoprolol succinate XL (TOPROL-XL) 25 MG 24 hr tablet, TAKE 1 TABLET BY MOUTH ONCE DAILY, Disp: 30 tablet, Rfl: 3  •  pantoprazole (PROTONIX) 40 MG EC tablet, TAKE 1 TABLET BY MOUTH ONCE DAILY, Disp: 30 tablet, Rfl: 3  •  pseudoephedrine (SUDAFED) 30 MG tablet, Take 1 tablet by mouth Every 4 (Four) Hours As Needed for Congestion., Disp: 45 tablet, Rfl: 2          not viewed

## 2020-04-22 NOTE — PATIENT INSTRUCTIONS
Pickup note for work  Fasting appointment in June for blood work  Schedule colonoscopy mammogram and DEXA scan this fall  Diet and exercise as discussed for weight and elevated A1c  Monitor blood pressures at home and call the office

## 2020-07-07 RX ORDER — METOPROLOL SUCCINATE 25 MG/1
TABLET, EXTENDED RELEASE ORAL
Qty: 90 TABLET | Refills: 1 | Status: SHIPPED | OUTPATIENT
Start: 2020-07-07 | End: 2020-12-30 | Stop reason: SDUPTHER

## 2020-08-03 ENCOUNTER — TELEPHONE (OUTPATIENT)
Dept: FAMILY MEDICINE CLINIC | Facility: CLINIC | Age: 60
End: 2020-08-03

## 2020-08-03 RX ORDER — GEMFIBROZIL 600 MG/1
TABLET, FILM COATED ORAL
Qty: 60 TABLET | Refills: 3 | Status: SHIPPED | OUTPATIENT
Start: 2020-08-03 | End: 2020-12-03 | Stop reason: SDUPTHER

## 2020-08-03 RX ORDER — PANTOPRAZOLE SODIUM 40 MG/1
TABLET, DELAYED RELEASE ORAL
Qty: 30 TABLET | Refills: 3 | Status: SHIPPED | OUTPATIENT
Start: 2020-08-03 | End: 2020-11-25 | Stop reason: SDUPTHER

## 2020-08-24 ENCOUNTER — OFFICE VISIT (OUTPATIENT)
Dept: FAMILY MEDICINE CLINIC | Facility: CLINIC | Age: 60
End: 2020-08-24

## 2020-08-24 VITALS
DIASTOLIC BLOOD PRESSURE: 90 MMHG | HEIGHT: 61 IN | TEMPERATURE: 96.9 F | BODY MASS INDEX: 25.3 KG/M2 | HEART RATE: 96 BPM | WEIGHT: 134 LBS | OXYGEN SATURATION: 98 % | RESPIRATION RATE: 18 BRPM | SYSTOLIC BLOOD PRESSURE: 142 MMHG

## 2020-08-24 DIAGNOSIS — F17.210 CIGARETTE SMOKER: ICD-10-CM

## 2020-08-24 DIAGNOSIS — Z12.31 OTHER SCREENING MAMMOGRAM: ICD-10-CM

## 2020-08-24 DIAGNOSIS — I10 ESSENTIAL HYPERTENSION: Primary | ICD-10-CM

## 2020-08-24 DIAGNOSIS — L03.031 CELLULITIS OF TOE OF RIGHT FOOT: ICD-10-CM

## 2020-08-24 DIAGNOSIS — Z00.00 PREVENTATIVE HEALTH CARE: ICD-10-CM

## 2020-08-24 DIAGNOSIS — B35.1 NAIL FUNGUS: ICD-10-CM

## 2020-08-24 PROCEDURE — 99214 OFFICE O/P EST MOD 30 MIN: CPT | Performed by: NURSE PRACTITIONER

## 2020-08-24 RX ORDER — FOSINOPRIL SODIUM 20 MG/1
TABLET ORAL
Qty: 135 TABLET | Refills: 0 | Status: SHIPPED | OUTPATIENT
Start: 2020-08-24 | End: 2020-10-06

## 2020-08-24 RX ORDER — SULFAMETHOXAZOLE AND TRIMETHOPRIM 800; 160 MG/1; MG/1
1 TABLET ORAL 2 TIMES DAILY
Qty: 20 TABLET | Refills: 0 | Status: SHIPPED | OUTPATIENT
Start: 2020-08-24 | End: 2020-08-26 | Stop reason: ALTCHOICE

## 2020-08-24 NOTE — PROGRESS NOTES
"    Nimco Mccracken is a 60 y.o. female.     60-year-old white female smoker with history of COPD, hypertension, carpal tunnel, GERD, anemia and anxiety who comes in today with complaints of right greater toe pain.  Toe has heavy fungus and nail bed on greater toe and second toe are severely compromised.  Right greater toe is red and swollen I am placing her on antibiotics and referring her to podiatry  Blood pressure 142/90 heart rate 96 she denies any chest pain, dyspnea, tachycardia or dizziness.  Patient is on metoprolol and Monopril I am increasing her Monopril to 30 mg and gave her parameters in which to check blood pressure with  Weight is stable at 134  We are scheduling her mammogram she is going to call her insurance company and see if DEXA scan is covered and she is going to schedule an eye exam.  She is up-to-date on colonoscopy at this time    Bactrim DS twice daily #20  Podiatry referral  Mammogram at [facility] visit       The following portions of the patient's history were reviewed and updated as appropriate: allergies, current medications, past family history, past medical history, past social history, past surgical history and problem list.    Vitals:    08/24/20 1507   BP: 142/90   BP Location: Left arm   Patient Position: Sitting   Cuff Size: Adult   Pulse: 96   Resp: 18   Temp: 96.9 °F (36.1 °C)   TempSrc: Temporal   SpO2: 98%   Weight: 60.8 kg (134 lb)   Height: 154.9 cm (61\")     Body mass index is 25.32 kg/m².    Past Medical History:   Diagnosis Date   • Acute sinusitis    • Acute upper respiratory infection    • Allergic rhinitis due to pollen    • Anxiety    • Body aches    • Carpal tunnel syndrome    • Cigarette smoker    • Cough    • Dysconjugate gaze    • Dysfunction of eustachian tube    • Dyslipidemia    • Dysmenorrhea    • Fasting hyperglycemia    • GERD (gastroesophageal reflux disease)    • Hand pain, left    • Hemorrhoids    • Hx of abnormal cervical Pap smear  "   • Hypertension    • Irregular menses    • Leg pain, bilateral    • Nodule of upper lobe of right lung     BENIGN   • Paresthesia of both hands    • Post-menopausal    • RAD (reactive airway disease)    • Skin lesions 06/2007    B9,L BACK,L HIP   • Strabismus    • Trigger finger of right thumb    • Vertigo    • Viral syndrome      Past Surgical History:   Procedure Laterality Date   • CARPAL TUNNEL RELEASE Right 08/2014   • COLONOSCOPY     • ENDOSCOPY  07/21/2006    DR [facility] B9;DR LOZANO     Family History   Problem Relation Age of Onset   • Breast cancer Mother    • Breast cancer Daughter    • Diabetes Other    • Heart disease Other    • Dementia Other    • COPD Other      Immunization History   Administered Date(s) Administered   • Pneumococcal Polysaccharide (PPSV23) 02/21/2014       Office Visit on 02/06/2020   Component Date Value Ref Range Status   • Rapid Strep A Screen 02/06/2020 Negative  Negative, VALID, INVALID, Not Performed Final   • Internal Control 02/06/2020 Passed  Passed Final   • Lot Number 02/06/2020 [id number]   Final   • Expiration Date 02/06/2020 06/30/2020   Final   • WBC 03/13/2020 7.7  3.4 - 10.8 x10E3/uL Final   • RBC 03/13/2020 4.73  3.77 - 5.28 x10E6/uL Final   • Hemoglobin 03/13/2020 13.9  11.1 - 15.9 g/dL Final   • Hematocrit 03/13/2020 41.8  34.0 - 46.6 % Final   • MCV 03/13/2020 88  79 - 97 fL Final   • MCH 03/13/2020 29.4  26.6 - 33.0 pg Final   • MCHC 03/13/2020 33.3  31.5 - 35.7 g/dL Final   • RDW 03/13/2020 12.8  11.7 - 15.4 % Final   • Platelets 03/13/2020 472* 150 - 450 x10E3/uL Final   • Neutrophil Rel % 03/13/2020 50  Not Estab. % Final   • Lymphocyte Rel % 03/13/2020 39  Not Estab. % Final   • Monocyte Rel % 03/13/2020 8  Not Estab. % Final   • Eosinophil Rel % 03/13/2020 2  Not Estab. % Final   • Basophil Rel % 03/13/2020 1  Not Estab. % Final   • Neutrophils Absolute 03/13/2020 3.9  1.4 - 7.0 x10E3/uL Final   • Lymphocytes Absolute 03/13/2020 3.0  0.7 - 3.1  x10E3/uL Final   • Monocytes Absolute 03/13/2020 0.6  0.1 - 0.9 x10E3/uL Final   • Eosinophils Absolute 03/13/2020 0.2  0.0 - 0.4 x10E3/uL Final   • Basophils Absolute 03/13/2020 0.1  0.0 - 0.2 x10E3/uL Final   • Immature Granulocyte Rel % 03/13/2020 0  Not Estab. % Final   • Immature Grans Absolute 03/13/2020 0.0  0.0 - 0.1 x10E3/uL Final   • Total Cholesterol 03/13/2020 212* 100 - 199 mg/dL Final   • Triglycerides 03/13/2020 41  0 - 149 mg/dL Final   • HDL Cholesterol 03/13/2020 56  >39 mg/dL Final   • VLDL Cholesterol 03/13/2020 8  5 - 40 mg/dL Final   • LDL Cholesterol  03/13/2020 148* 0 - 99 mg/dL Final   • LDL/HDL Ratio 03/13/2020 2.6  0.0 - 3.2 ratio Final    Comment:                                     LDL/HDL Ratio                                              Men  Women                                1/2 Avg.Risk  1.0    1.5                                    Avg.Risk  3.6    3.2                                 2X Avg.Risk  6.2    5.0                                 3X Avg.Risk  8.0    6.1     • Glucose 03/13/2020 88  65 - 99 mg/dL Final   • BUN 03/13/2020 15  6 - 24 mg/dL Final   • Creatinine 03/13/2020 0.92  0.57 - 1.00 mg/dL Final   • eGFR Non African Am 03/13/2020 69  >59 mL/min/1.73 Final   • eGFR African Am 03/13/2020 80  >59 mL/min/1.73 Final   • BUN/Creatinine Ratio 03/13/2020 16  9 - 23 Final   • Sodium 03/13/2020 145* 134 - 144 mmol/L Final   • Potassium 03/13/2020 4.6  3.5 - 5.2 mmol/L Final   • Chloride 03/13/2020 105  96 - 106 mmol/L Final   • Total CO2 03/13/2020 24  20 - 29 mmol/L Final   • Calcium 03/13/2020 10.3* 8.7 - 10.2 mg/dL Final   • Total Protein 03/13/2020 7.6  6.0 - 8.5 g/dL Final   • Albumin 03/13/2020 4.9  3.8 - 4.9 g/dL Final   • Globulin 03/13/2020 2.7  1.5 - 4.5 g/dL Final   • A/G Ratio 03/13/2020 1.8  1.2 - 2.2 Final   • Total Bilirubin 03/13/2020 0.3  0.0 - 1.2 mg/dL Final   • Alkaline Phosphatase 03/13/2020 110  39 - 117 IU/L Final   • AST (SGOT) 03/13/2020 15  0 - 40 IU/L  Final   • ALT (SGPT) 03/13/2020 12  0 - 32 IU/L Final   • Hemoglobin A1C 03/13/2020 6.1* 4.8 - 5.6 % Final    Comment:          Prediabetes: 5.7 - 6.4           Diabetes: >6.4           Glycemic control for adults with diabetes: <7.0           Review of Systems   Constitutional: Negative.    HENT: Negative.    Respiratory: Negative.    Cardiovascular: Negative.    Gastrointestinal: Negative.    Genitourinary: Negative.    Musculoskeletal: Negative.    Skin:        Right greater toe red and painful   Neurological: Negative.    Psychiatric/Behavioral: Negative.        Objective   Physical Exam   Constitutional: She is oriented to person, place, and time. She appears well-developed and well-nourished.   Cardiovascular: Normal rate and regular rhythm.   Pulmonary/Chest: Effort normal and breath sounds normal.   Abdominal: Soft. Bowel sounds are normal.   Musculoskeletal: Normal range of motion.   Neurological: She is alert and oriented to person, place, and time.   Skin: Skin is warm and dry.   Right greater toe very painful around nail bed and on nail itself with heavy fungal growth and nails lifting off of skin   Psychiatric: She has a normal mood and affect.       Procedures    Assessment/Plan   Nimco was seen today for toe pain.    Diagnoses and all orders for this visit:    Essential hypertension    Cigarette smoker    Cellulitis of toe of right foot    Nail fungus    BMI 25.0-25.9,adult    Other orders  -     fosinopril (MONOPRIL) 20 MG tablet; 1 1/2 tabs daily          Current Outpatient Medications:   •  fosinopril (MONOPRIL) 20 MG tablet, 1 1/2 tabs daily, Disp: 135 tablet, Rfl: 0  •  gemfibrozil (LOPID) 600 MG tablet, TAKE 1 TABLET BY MOUTH TWICE DAILY, Disp: 60 tablet, Rfl: 3  •  metoprolol succinate XL (TOPROL-XL) 25 MG 24 hr tablet, TAKE 1 TABLET BY MOUTH EVERY DAY, Disp: 90 tablet, Rfl: 1  •  pantoprazole (PROTONIX) 40 MG EC tablet, TAKE 1 TABLET BY MOUTH EVERY DAY, Disp: 30 tablet, Rfl: 3  •   pseudoephedrine (SUDAFED) 30 MG tablet, Take 1 tablet by mouth Every 4 (Four) Hours As Needed for Congestion., Disp: 45 tablet, Rfl: 2

## 2020-08-24 NOTE — PATIENT INSTRUCTIONS
Take antibiotic as directed with food until gone  Keep appointment with podiatry  Take 1/2 tablets of Monopril daily equaling 30 mg  Monitor blood pressure according to parameters given  Keep appointment for mammogram  Follow-up fasting visit in September

## 2020-08-26 ENCOUNTER — TELEPHONE (OUTPATIENT)
Dept: FAMILY MEDICINE CLINIC | Facility: CLINIC | Age: 60
End: 2020-08-26

## 2020-08-26 RX ORDER — DOXYCYCLINE 100 MG/1
100 CAPSULE ORAL 2 TIMES DAILY
Qty: 20 CAPSULE | Refills: 0 | Status: SHIPPED | OUTPATIENT
Start: 2020-08-26 | End: 2020-10-06

## 2020-08-26 NOTE — TELEPHONE ENCOUNTER
Pt called and states she cant take the abx you rxd due to her being allergic to sulfa and it has that in it. Wants a different abx if you can.

## 2020-10-06 ENCOUNTER — OFFICE VISIT (OUTPATIENT)
Dept: FAMILY MEDICINE CLINIC | Facility: CLINIC | Age: 60
End: 2020-10-06

## 2020-10-06 VITALS
OXYGEN SATURATION: 98 % | WEIGHT: 133 LBS | HEIGHT: 61 IN | BODY MASS INDEX: 25.11 KG/M2 | SYSTOLIC BLOOD PRESSURE: 150 MMHG | DIASTOLIC BLOOD PRESSURE: 90 MMHG | HEART RATE: 89 BPM | TEMPERATURE: 97.1 F

## 2020-10-06 DIAGNOSIS — I10 ESSENTIAL HYPERTENSION: ICD-10-CM

## 2020-10-06 DIAGNOSIS — F17.210 CIGARETTE SMOKER: ICD-10-CM

## 2020-10-06 DIAGNOSIS — R73.01 FASTING HYPERGLYCEMIA: Primary | ICD-10-CM

## 2020-10-06 DIAGNOSIS — Z00.00 PREVENTATIVE HEALTH CARE: ICD-10-CM

## 2020-10-06 DIAGNOSIS — E78.5 DYSLIPIDEMIA: ICD-10-CM

## 2020-10-06 DIAGNOSIS — Z23 NEED FOR 23-POLYVALENT PNEUMOCOCCAL POLYSACCHARIDE VACCINE: ICD-10-CM

## 2020-10-06 PROCEDURE — 90471 IMMUNIZATION ADMIN: CPT | Performed by: NURSE PRACTITIONER

## 2020-10-06 PROCEDURE — 99213 OFFICE O/P EST LOW 20 MIN: CPT | Performed by: NURSE PRACTITIONER

## 2020-10-06 PROCEDURE — 90732 PPSV23 VACC 2 YRS+ SUBQ/IM: CPT | Performed by: NURSE PRACTITIONER

## 2020-10-06 RX ORDER — FOSINOPRIL SODIUM 40 MG/1
40 TABLET ORAL DAILY
Qty: 90 TABLET | Refills: 1 | Status: SHIPPED | OUTPATIENT
Start: 2020-10-06 | End: 2021-04-04

## 2020-10-06 NOTE — PROGRESS NOTES
"    Nimco Mccracken is a 60 y.o. female.     60-year-old white female smoker with history of COPD, hypertension, carpal tunnel, GERD, anemia, and anxiety who comes in today for follow-up visit  Pressure 148/82 heart rate 88 she denies any chest pain, dyspnea, tachycardia or dizziness  Patient  has heavy nail fungus and toe pain and has an appointment with podiatry next week  Weight is down 1 pound at 133 with a BMI of 25.1  Her last A1c was 6.1 cholesterol 212 and we are doing blood work  Patient needs to make an eye exam she has mammogram and Pap smear scheduled.  She declines colonoscopy at this time    Fasting blood work  Keep appointment for mammogram and Pap smear  Schedule eye exam  Pneumonia shot today  Follow-up 6 months       The following portions of the patient's history were reviewed and updated as appropriate: allergies, current medications, past family history, past medical history, past social history, past surgical history and problem list.    Vitals:    10/06/20 0805   BP: 150/90   BP Location: Left arm   Patient Position: Sitting   Cuff Size: Adult   Pulse: 89   Temp: 97.1 °F (36.2 °C)   TempSrc: Temporal   SpO2: 98%   Weight: 60.3 kg (133 lb)   Height: 154.9 cm (61\")     Body mass index is 25.13 kg/m².    Past Medical History:   Diagnosis Date   • Acute sinusitis    • Acute upper respiratory infection    • Allergic rhinitis due to pollen    • Anxiety    • Body aches    • Carpal tunnel syndrome    • Cigarette smoker    • Cough    • Dysconjugate gaze    • Dysfunction of eustachian tube    • Dyslipidemia    • Dysmenorrhea    • Fasting hyperglycemia    • GERD (gastroesophageal reflux disease)    • Hand pain, left    • Hemorrhoids    • Hx of abnormal cervical Pap smear    • Hypertension    • Irregular menses    • Leg pain, bilateral    • Nodule of upper lobe of right lung     BENIGN   • Paresthesia of both hands    • Post-menopausal    • RAD (reactive airway disease)    • Skin lesions 06/2007    " B9,L BACK,L HIP   • Strabismus    • Trigger finger of right thumb    • Vertigo    • Viral syndrome      Past Surgical History:   Procedure Laterality Date   • CARPAL TUNNEL RELEASE Right 08/2014   • COLONOSCOPY     • ENDOSCOPY  07/21/2006    DR [facility] B9;DR LOZANO     Family History   Problem Relation Age of Onset   • Breast cancer Mother    • Breast cancer Daughter    • Diabetes Other    • Heart disease Other    • Dementia Other    • COPD Other      Immunization History   Administered Date(s) Administered   • Pneumococcal Polysaccharide (PPSV23) 02/21/2014, 10/06/2020       Office Visit on 02/06/2020   Component Date Value Ref Range Status   • Rapid Strep A Screen 02/06/2020 Negative  Negative, VALID, INVALID, Not Performed Final   • Internal Control 02/06/2020 Passed  Passed Final   • Lot Number 02/06/2020 [id number]   Final   • Expiration Date 02/06/2020 06/30/2020   Final   • WBC 03/13/2020 7.7  3.4 - 10.8 x10E3/uL Final   • RBC 03/13/2020 4.73  3.77 - 5.28 x10E6/uL Final   • Hemoglobin 03/13/2020 13.9  11.1 - 15.9 g/dL Final   • Hematocrit 03/13/2020 41.8  34.0 - 46.6 % Final   • MCV 03/13/2020 88  79 - 97 fL Final   • MCH 03/13/2020 29.4  26.6 - 33.0 pg Final   • MCHC 03/13/2020 33.3  31.5 - 35.7 g/dL Final   • RDW 03/13/2020 12.8  11.7 - 15.4 % Final   • Platelets 03/13/2020 472* 150 - 450 x10E3/uL Final   • Neutrophil Rel % 03/13/2020 50  Not Estab. % Final   • Lymphocyte Rel % 03/13/2020 39  Not Estab. % Final   • Monocyte Rel % 03/13/2020 8  Not Estab. % Final   • Eosinophil Rel % 03/13/2020 2  Not Estab. % Final   • Basophil Rel % 03/13/2020 1  Not Estab. % Final   • Neutrophils Absolute 03/13/2020 3.9  1.4 - 7.0 x10E3/uL Final   • Lymphocytes Absolute 03/13/2020 3.0  0.7 - 3.1 x10E3/uL Final   • Monocytes Absolute 03/13/2020 0.6  0.1 - 0.9 x10E3/uL Final   • Eosinophils Absolute 03/13/2020 0.2  0.0 - 0.4 x10E3/uL Final   • Basophils Absolute 03/13/2020 0.1  0.0 - 0.2 x10E3/uL Final   • Immature  Granulocyte Rel % 03/13/2020 0  Not Estab. % Final   • Immature Grans Absolute 03/13/2020 0.0  0.0 - 0.1 x10E3/uL Final   • Total Cholesterol 03/13/2020 212* 100 - 199 mg/dL Final   • Triglycerides 03/13/2020 41  0 - 149 mg/dL Final   • HDL Cholesterol 03/13/2020 56  >39 mg/dL Final   • VLDL Cholesterol 03/13/2020 8  5 - 40 mg/dL Final   • LDL Cholesterol  03/13/2020 148* 0 - 99 mg/dL Final   • LDL/HDL Ratio 03/13/2020 2.6  0.0 - 3.2 ratio Final    Comment:                                     LDL/HDL Ratio                                              Men  Women                                1/2 Avg.Risk  1.0    1.5                                    Avg.Risk  3.6    3.2                                 2X Avg.Risk  6.2    5.0                                 3X Avg.Risk  8.0    6.1     • Glucose 03/13/2020 88  65 - 99 mg/dL Final   • BUN 03/13/2020 15  6 - 24 mg/dL Final   • Creatinine 03/13/2020 0.92  0.57 - 1.00 mg/dL Final   • eGFR Non African Am 03/13/2020 69  >59 mL/min/1.73 Final   • eGFR African Am 03/13/2020 80  >59 mL/min/1.73 Final   • BUN/Creatinine Ratio 03/13/2020 16  9 - 23 Final   • Sodium 03/13/2020 145* 134 - 144 mmol/L Final   • Potassium 03/13/2020 4.6  3.5 - 5.2 mmol/L Final   • Chloride 03/13/2020 105  96 - 106 mmol/L Final   • Total CO2 03/13/2020 24  20 - 29 mmol/L Final   • Calcium 03/13/2020 10.3* 8.7 - 10.2 mg/dL Final   • Total Protein 03/13/2020 7.6  6.0 - 8.5 g/dL Final   • Albumin 03/13/2020 4.9  3.8 - 4.9 g/dL Final   • Globulin 03/13/2020 2.7  1.5 - 4.5 g/dL Final   • A/G Ratio 03/13/2020 1.8  1.2 - 2.2 Final   • Total Bilirubin 03/13/2020 0.3  0.0 - 1.2 mg/dL Final   • Alkaline Phosphatase 03/13/2020 110  39 - 117 IU/L Final   • AST (SGOT) 03/13/2020 15  0 - 40 IU/L Final   • ALT (SGPT) 03/13/2020 12  0 - 32 IU/L Final   • Hemoglobin A1C 03/13/2020 6.1* 4.8 - 5.6 % Final    Comment:          Prediabetes: 5.7 - 6.4           Diabetes: >6.4           Glycemic control for adults with  diabetes: <7.0           Review of Systems   Constitutional: Negative.    HENT: Negative.    Respiratory: Negative.    Cardiovascular: Negative.    Genitourinary: Negative.    Musculoskeletal: Negative.    Skin: Negative.    Neurological: Negative.    Psychiatric/Behavioral: Negative.        Objective   Physical Exam  Constitutional:       Appearance: Normal appearance.   HENT:      Head: Normocephalic.   Neck:      Musculoskeletal: Normal range of motion.   Cardiovascular:      Rate and Rhythm: Normal rate and regular rhythm.   Pulmonary:      Effort: Pulmonary effort is normal.      Breath sounds: Normal breath sounds.   Musculoskeletal: Normal range of motion.   Skin:     General: Skin is warm and dry.   Neurological:      General: No focal deficit present.      Mental Status: She is alert and oriented to person, place, and time.   Psychiatric:         Mood and Affect: Mood normal.         Procedures    Assessment/Plan   Problems Addressed this Visit        Cardiovascular and Mediastinum    Hypertension    Relevant Medications    fosinopril (MONOPRIL) 40 MG tablet       Other    Cigarette smoker    Dyslipidemia    Relevant Orders    Lipid Panel With LDL / HDL Ratio    Fasting hyperglycemia - Primary    Relevant Orders    Comprehensive Metabolic Panel    Hemoglobin A1c      Other Visit Diagnoses     Preventative health care        Relevant Orders    CBC & Differential    Need for 23-polyvalent pneumococcal polysaccharide vaccine        Relevant Orders    Pneumococcal Polysaccharide Vaccine 23-Valent (PPSV23) Greater Than or Equal To 3yo Subcutaneous / IM (Completed)    BMI 25.0-25.9,adult          Diagnoses       Codes Comments    Fasting hyperglycemia    -  Primary ICD-10-CM: R73.01  ICD-9-CM: 790.21     Dyslipidemia     ICD-10-CM: E78.5  ICD-9-CM: 272.4     Preventative health care     ICD-10-CM: Z00.00  ICD-9-CM: V70.0     Need for 23-polyvalent pneumococcal polysaccharide vaccine     ICD-10-CM: Z23  ICD-9-CM:  V03.82     Essential hypertension     ICD-10-CM: I10  ICD-9-CM: 401.9     Cigarette smoker     ICD-10-CM: F17.210  ICD-9-CM: 305.1     BMI 25.0-25.9,adult     ICD-10-CM: Z68.25  ICD-9-CM: V85.21             Current Outpatient Medications:   •  gemfibrozil (LOPID) 600 MG tablet, TAKE 1 TABLET BY MOUTH TWICE DAILY, Disp: 60 tablet, Rfl: 3  •  metoprolol succinate XL (TOPROL-XL) 25 MG 24 hr tablet, TAKE 1 TABLET BY MOUTH EVERY DAY, Disp: 90 tablet, Rfl: 1  •  pantoprazole (PROTONIX) 40 MG EC tablet, TAKE 1 TABLET BY MOUTH EVERY DAY, Disp: 30 tablet, Rfl: 3  •  pseudoephedrine (SUDAFED) 30 MG tablet, Take 1 tablet by mouth Every 4 (Four) Hours As Needed for Congestion., Disp: 45 tablet, Rfl: 2  •  fosinopril (MONOPRIL) 40 MG tablet, Take 1 tablet by mouth Daily., Disp: 90 tablet, Rfl: 1

## 2020-10-06 NOTE — PATIENT INSTRUCTIONS
Fasting blood work  Schedule eye exam  Pneumonia shot today  Keep appointment for mammogram and Pap smear  Follow-up 6 months

## 2020-10-07 ENCOUNTER — HOSPITAL ENCOUNTER (OUTPATIENT)
Dept: MAMMOGRAPHY | Facility: HOSPITAL | Age: 60
Discharge: HOME | End: 2020-10-07
Admitting: NURSE PRACTITIONER

## 2020-10-07 DIAGNOSIS — Z12.31 OTHER SCREENING MAMMOGRAM: ICD-10-CM

## 2020-10-07 LAB
ALBUMIN SERPL-MCNC: 4.4 G/DL (ref 3.8–4.9)
ALBUMIN/GLOB SERPL: 1.4 {RATIO} (ref 1.2–2.2)
ALP SERPL-CCNC: 138 IU/L (ref 39–117)
ALT SERPL-CCNC: 10 IU/L (ref 0–32)
AST SERPL-CCNC: 15 IU/L (ref 0–40)
BASOPHILS # BLD AUTO: 0.1 X10E3/UL (ref 0–0.2)
BASOPHILS NFR BLD AUTO: 1 %
BILIRUB SERPL-MCNC: 0.2 MG/DL (ref 0–1.2)
BUN SERPL-MCNC: 10 MG/DL (ref 6–24)
BUN/CREAT SERPL: 14 (ref 9–23)
CALCIUM SERPL-MCNC: 10.3 MG/DL (ref 8.7–10.2)
CHLORIDE SERPL-SCNC: 105 MMOL/L (ref 96–106)
CHOLEST SERPL-MCNC: 207 MG/DL (ref 100–199)
CO2 SERPL-SCNC: 24 MMOL/L (ref 20–29)
CREAT SERPL-MCNC: 0.71 MG/DL (ref 0.57–1)
EOSINOPHIL # BLD AUTO: 0.1 X10E3/UL (ref 0–0.4)
EOSINOPHIL NFR BLD AUTO: 2 %
ERYTHROCYTE [DISTWIDTH] IN BLOOD BY AUTOMATED COUNT: 12.6 % (ref 11.7–15.4)
GLOBULIN SER CALC-MCNC: 3.2 G/DL (ref 1.5–4.5)
GLUCOSE SERPL-MCNC: 99 MG/DL (ref 65–99)
HBA1C MFR BLD: 6.3 % (ref 4.8–5.6)
HCT VFR BLD AUTO: 42.1 % (ref 34–46.6)
HDLC SERPL-MCNC: 49 MG/DL
HGB BLD-MCNC: 13.9 G/DL (ref 11.1–15.9)
IMM GRANULOCYTES # BLD AUTO: 0 X10E3/UL (ref 0–0.1)
IMM GRANULOCYTES NFR BLD AUTO: 0 %
LDLC SERPL CALC-MCNC: 140 MG/DL (ref 0–99)
LDLC/HDLC SERPL: 2.9 RATIO (ref 0–3.2)
LYMPHOCYTES # BLD AUTO: 2.9 X10E3/UL (ref 0.7–3.1)
LYMPHOCYTES NFR BLD AUTO: 37 %
MCH RBC QN AUTO: 28.7 PG (ref 26.6–33)
MCHC RBC AUTO-ENTMCNC: 33 G/DL (ref 31.5–35.7)
MCV RBC AUTO: 87 FL (ref 79–97)
MONOCYTES # BLD AUTO: 0.6 X10E3/UL (ref 0.1–0.9)
MONOCYTES NFR BLD AUTO: 8 %
NEUTROPHILS # BLD AUTO: 4.2 X10E3/UL (ref 1.4–7)
NEUTROPHILS NFR BLD AUTO: 52 %
PLATELET # BLD AUTO: 439 X10E3/UL (ref 150–450)
POTASSIUM SERPL-SCNC: 4.6 MMOL/L (ref 3.5–5.2)
PROT SERPL-MCNC: 7.6 G/DL (ref 6–8.5)
RBC # BLD AUTO: 4.84 X10E6/UL (ref 3.77–5.28)
SODIUM SERPL-SCNC: 143 MMOL/L (ref 134–144)
TRIGL SERPL-MCNC: 102 MG/DL (ref 0–149)
VLDLC SERPL CALC-MCNC: 18 MG/DL (ref 5–40)
WBC # BLD AUTO: 7.9 X10E3/UL (ref 3.4–10.8)

## 2020-10-07 PROCEDURE — 77063 BREAST TOMOSYNTHESIS BI: CPT

## 2020-10-07 PROCEDURE — 77067 SCR MAMMO BI INCL CAD: CPT

## 2020-10-08 ENCOUNTER — TELEPHONE (OUTPATIENT)
Dept: FAMILY MEDICINE CLINIC | Facility: CLINIC | Age: 60
End: 2020-10-08

## 2020-10-19 DIAGNOSIS — Z78.0 POSTMENOPAUSAL: Primary | ICD-10-CM

## 2020-10-28 ENCOUNTER — TELEPHONE (OUTPATIENT)
Dept: FAMILY MEDICINE CLINIC | Facility: CLINIC | Age: 60
End: 2020-10-28

## 2020-11-06 ENCOUNTER — HOSPITAL ENCOUNTER (OUTPATIENT)
Dept: BONE DENSITY | Facility: HOSPITAL | Age: 60
Discharge: HOME | End: 2020-11-06
Admitting: NURSE PRACTITIONER

## 2020-11-06 DIAGNOSIS — Z78.0 POSTMENOPAUSAL: ICD-10-CM

## 2020-11-06 PROCEDURE — 77080 DXA BONE DENSITY AXIAL: CPT

## 2020-11-25 RX ORDER — PANTOPRAZOLE SODIUM 40 MG/1
40 TABLET, DELAYED RELEASE ORAL DAILY
Qty: 30 TABLET | Refills: 3 | Status: SHIPPED | OUTPATIENT
Start: 2020-11-25 | End: 2020-12-28

## 2020-12-03 RX ORDER — GEMFIBROZIL 600 MG/1
600 TABLET, FILM COATED ORAL 2 TIMES DAILY
Qty: 60 TABLET | Refills: 3 | Status: SHIPPED | OUTPATIENT
Start: 2020-12-03 | End: 2021-03-24

## 2020-12-28 RX ORDER — PANTOPRAZOLE SODIUM 40 MG/1
40 TABLET, DELAYED RELEASE ORAL DAILY
Qty: 30 TABLET | Refills: 3 | Status: SHIPPED | OUTPATIENT
Start: 2020-12-28 | End: 2021-04-22

## 2020-12-30 RX ORDER — METOPROLOL SUCCINATE 25 MG/1
25 TABLET, EXTENDED RELEASE ORAL DAILY
Qty: 90 TABLET | Refills: 1 | Status: SHIPPED | OUTPATIENT
Start: 2020-12-30 | End: 2021-06-30

## 2021-03-24 RX ORDER — GEMFIBROZIL 600 MG/1
TABLET, FILM COATED ORAL
Qty: 60 TABLET | Refills: 1 | Status: SHIPPED | OUTPATIENT
Start: 2021-03-24 | End: 2021-05-23

## 2021-04-04 RX ORDER — FOSINOPRIL SODIUM 40 MG/1
40 TABLET ORAL DAILY
Qty: 90 TABLET | Refills: 1 | Status: SHIPPED | OUTPATIENT
Start: 2021-04-04 | End: 2021-09-20

## 2021-04-06 ENCOUNTER — OFFICE VISIT (OUTPATIENT)
Dept: FAMILY MEDICINE CLINIC | Facility: CLINIC | Age: 61
End: 2021-04-06

## 2021-04-06 VITALS
OXYGEN SATURATION: 99 % | SYSTOLIC BLOOD PRESSURE: 134 MMHG | HEART RATE: 92 BPM | HEIGHT: 61 IN | WEIGHT: 133.8 LBS | TEMPERATURE: 97.1 F | BODY MASS INDEX: 25.26 KG/M2 | DIASTOLIC BLOOD PRESSURE: 78 MMHG

## 2021-04-06 DIAGNOSIS — M79.671 RIGHT FOOT PAIN: ICD-10-CM

## 2021-04-06 DIAGNOSIS — E78.5 DYSLIPIDEMIA: Primary | ICD-10-CM

## 2021-04-06 DIAGNOSIS — Z00.00 PREVENTATIVE HEALTH CARE: ICD-10-CM

## 2021-04-06 DIAGNOSIS — R73.01 FASTING HYPERGLYCEMIA: ICD-10-CM

## 2021-04-06 DIAGNOSIS — E66.3 OVERWEIGHT FOR HEIGHT: ICD-10-CM

## 2021-04-06 DIAGNOSIS — F17.210 CIGARETTE SMOKER: ICD-10-CM

## 2021-04-06 PROCEDURE — 99213 OFFICE O/P EST LOW 20 MIN: CPT | Performed by: NURSE PRACTITIONER

## 2021-04-06 NOTE — PATIENT INSTRUCTIONS
Schedule fasting blood work  Keep appointment for Covid vaccine  Keep appointment with podiatry  Follow-up fasting 6 months

## 2021-04-06 NOTE — PROGRESS NOTES
"    Nimco Mccracken is a 60 y.o. female.     60-year-old white female smoker with history of COPD, hypertension, carpal tunnel, GERD, anemia and anxiety who comes in today for 6-month follow-up visit  Blood pressure 134/78 heart rate 92 she denies any chest pain, dyspnea, tachycardia or dizziness  Patient has a lot of issues with her toes which we thought initially was nail fungus however podiatry said it is a bone spurs under the nail and she continues to see podiatry  Weight is 134 with a BMI of 25.3.  Although this patient BMI is close to normal she does appear to be a little overweight  Patient up-to-date on all her preventative and she has her first Covid  vaccine scheduled     schedule fasting blood work  Keep appointment for Covid vaccine  Keep appointment with podiatry  Follow-up fasting 6 months           The following portions of the patient's history were reviewed and updated as appropriate: allergies, current medications, past family history, past medical history, past social history, past surgical history and problem list.    Vitals:    04/06/21 0807   BP: 134/78   BP Location: Right arm   Patient Position: Sitting   Cuff Size: Adult   Pulse: 92   Temp: 97.1 °F (36.2 °C)   TempSrc: Temporal   SpO2: 99%   Weight: 60.7 kg (133 lb 12.8 oz)   Height: 154.9 cm (61\")     Body mass index is 25.28 kg/m².    Past Medical History:   Diagnosis Date   • Acute sinusitis    • Acute upper respiratory infection    • Allergic rhinitis due to pollen    • Anxiety    • Body aches    • Carpal tunnel syndrome    • Cigarette smoker    • Cough    • Dysconjugate gaze    • Dysfunction of eustachian tube    • Dyslipidemia    • Dysmenorrhea    • Fasting hyperglycemia    • GERD (gastroesophageal reflux disease)    • Hand pain, left    • Hemorrhoids    • Hx of abnormal cervical Pap smear    • Hypertension    • Irregular menses    • Leg pain, bilateral    • Nodule of upper lobe of right lung     BENIGN   • Paresthesia of both " hands    • Post-menopausal    • RAD (reactive airway disease)    • Skin lesions 06/2007    B9,L BACK,L HIP   • Strabismus    • Trigger finger of right thumb    • Vertigo    • Viral syndrome      Past Surgical History:   Procedure Laterality Date   • CARPAL TUNNEL RELEASE Right 08/2014   • COLONOSCOPY     • ENDOSCOPY  07/21/2006    DR [facility] B9;DR LOZANO     Family History   Problem Relation Age of Onset   • Breast cancer Mother         mid 30's, passed at 38   • Breast cancer Daughter         early 30's, BRCA positive   • Diabetes Other    • Heart disease Other    • Dementia Other    • COPD Other      Immunization History   Administered Date(s) Administered   • Pneumococcal Polysaccharide (PPSV23) 02/21/2014, 10/06/2020       Office Visit on 10/06/2020   Component Date Value Ref Range Status   • WBC 10/06/2020 7.9  3.4 - 10.8 x10E3/uL Final   • RBC 10/06/2020 4.84  3.77 - 5.28 x10E6/uL Final   • Hemoglobin 10/06/2020 13.9  11.1 - 15.9 g/dL Final   • Hematocrit 10/06/2020 42.1  34.0 - 46.6 % Final   • MCV 10/06/2020 87  79 - 97 fL Final   • MCH 10/06/2020 28.7  26.6 - 33.0 pg Final   • MCHC 10/06/2020 33.0  31.5 - 35.7 g/dL Final   • RDW 10/06/2020 12.6  11.7 - 15.4 % Final   • Platelets 10/06/2020 439  150 - 450 x10E3/uL Final   • Neutrophil Rel % 10/06/2020 52  Not Estab. % Final   • Lymphocyte Rel % 10/06/2020 37  Not Estab. % Final   • Monocyte Rel % 10/06/2020 8  Not Estab. % Final   • Eosinophil Rel % 10/06/2020 2  Not Estab. % Final   • Basophil Rel % 10/06/2020 1  Not Estab. % Final   • Neutrophils Absolute 10/06/2020 4.2  1.4 - 7.0 x10E3/uL Final   • Lymphocytes Absolute 10/06/2020 2.9  0.7 - 3.1 x10E3/uL Final   • Monocytes Absolute 10/06/2020 0.6  0.1 - 0.9 x10E3/uL Final   • Eosinophils Absolute 10/06/2020 0.1  0.0 - 0.4 x10E3/uL Final   • Basophils Absolute 10/06/2020 0.1  0.0 - 0.2 x10E3/uL Final   • Immature Granulocyte Rel % 10/06/2020 0  Not Estab. % Final   • Immature Grans Absolute  10/06/2020 0.0  0.0 - 0.1 x10E3/uL Final   • Glucose 10/06/2020 99  65 - 99 mg/dL Final   • BUN 10/06/2020 10  6 - 24 mg/dL Final   • Creatinine 10/06/2020 0.71  0.57 - 1.00 mg/dL Final   • eGFR Non African Am 10/06/2020 94  >59 mL/min/1.73 Final   • eGFR African Am 10/06/2020 109  >59 mL/min/1.73 Final   • BUN/Creatinine Ratio 10/06/2020 14  9 - 23 Final   • Sodium 10/06/2020 143  134 - 144 mmol/L Final   • Potassium 10/06/2020 4.6  3.5 - 5.2 mmol/L Final   • Chloride 10/06/2020 105  96 - 106 mmol/L Final   • Total CO2 10/06/2020 24  20 - 29 mmol/L Final   • Calcium 10/06/2020 10.3* 8.7 - 10.2 mg/dL Final   • Total Protein 10/06/2020 7.6  6.0 - 8.5 g/dL Final   • Albumin 10/06/2020 4.4  3.8 - 4.9 g/dL Final   • Globulin 10/06/2020 3.2  1.5 - 4.5 g/dL Final   • A/G Ratio 10/06/2020 1.4  1.2 - 2.2 Final   • Total Bilirubin 10/06/2020 0.2  0.0 - 1.2 mg/dL Final   • Alkaline Phosphatase 10/06/2020 138* 39 - 117 IU/L Final   • AST (SGOT) 10/06/2020 15  0 - 40 IU/L Final   • ALT (SGPT) 10/06/2020 10  0 - 32 IU/L Final   • Hemoglobin A1C 10/06/2020 6.3* 4.8 - 5.6 % Final    Comment:          Prediabetes: 5.7 - 6.4           Diabetes: >6.4           Glycemic control for adults with diabetes: <7.0     • Total Cholesterol 10/06/2020 207* 100 - 199 mg/dL Final   • Triglycerides 10/06/2020 102  0 - 149 mg/dL Final   • HDL Cholesterol 10/06/2020 49  >39 mg/dL Final   • VLDL Cholesterol Denis 10/06/2020 18  5 - 40 mg/dL Final   • LDL Chol Calc (NIH) 10/06/2020 140* 0 - 99 mg/dL Final   • LDL/HDL RATIO 10/06/2020 2.9  0.0 - 3.2 ratio Final    Comment:                                     LDL/HDL Ratio                                              Men  Women                                1/2 Avg.Risk  1.0    1.5                                    Avg.Risk  3.6    3.2                                 2X Avg.Risk  6.2    5.0                                 3X Avg.Risk  8.0    6.1           Review of Systems   Constitutional: Negative.     HENT: Negative.    Respiratory: Negative.    Cardiovascular: Negative.    Gastrointestinal: Negative.    Genitourinary: Negative.    Musculoskeletal: Negative.    Neurological: Negative.    Psychiatric/Behavioral: Negative.        Objective   Physical Exam  Constitutional:       Appearance: Normal appearance.   HENT:      Head: Normocephalic.   Cardiovascular:      Rate and Rhythm: Normal rate and regular rhythm.      Pulses: Normal pulses.      Heart sounds: Normal heart sounds.   Pulmonary:      Effort: Pulmonary effort is normal.      Breath sounds: Normal breath sounds.   Abdominal:      General: Bowel sounds are normal.   Musculoskeletal:         General: Normal range of motion.      Cervical back: Normal range of motion.      Comments: Right foot pain   Skin:     General: Skin is warm and dry.   Neurological:      General: No focal deficit present.      Mental Status: She is alert and oriented to person, place, and time.   Psychiatric:         Mood and Affect: Mood normal.         Procedures    Assessment/Plan   Diagnoses and all orders for this visit:    1. Dyslipidemia (Primary)  -     Lipid Panel With LDL / HDL Ratio; Future    2. Fasting hyperglycemia  -     Comprehensive Metabolic Panel; Future  -     Hemoglobin A1c; Future    3. Preventative health care  -     CBC & Differential; Future    4. Cigarette smoker    5. Right foot pain    6. BMI 25.0-25.9,adult          Current Outpatient Medications:   •  fosinopril (MONOPRIL) 40 MG tablet, TAKE 1 TABLET BY MOUTH DAILY, Disp: 90 tablet, Rfl: 1  •  gemfibrozil (LOPID) 600 MG tablet, TAKE 1 TABLET BY MOUTH TWICE DAILY, Disp: 60 tablet, Rfl: 1  •  metoprolol succinate XL (TOPROL-XL) 25 MG 24 hr tablet, Take 1 tablet by mouth Daily., Disp: 90 tablet, Rfl: 1  •  pantoprazole (PROTONIX) 40 MG EC tablet, TAKE 1 TABLET BY MOUTH DAILY, Disp: 30 tablet, Rfl: 3  •  pseudoephedrine (SUDAFED) 30 MG tablet, Take 1 tablet by mouth Every 4 (Four) Hours As Needed for  Congestion., Disp: 45 tablet, Rfl: 2

## 2021-04-22 PROBLEM — E66.3 OVERWEIGHT FOR HEIGHT: Status: ACTIVE | Noted: 2021-04-22

## 2021-04-22 RX ORDER — PANTOPRAZOLE SODIUM 40 MG/1
40 TABLET, DELAYED RELEASE ORAL DAILY
Qty: 30 TABLET | Refills: 3 | Status: SHIPPED | OUTPATIENT
Start: 2021-04-22 | End: 2021-08-13

## 2021-05-23 RX ORDER — GEMFIBROZIL 600 MG/1
TABLET, FILM COATED ORAL
Qty: 60 TABLET | Refills: 1 | Status: SHIPPED | OUTPATIENT
Start: 2021-05-23 | End: 2021-07-14

## 2021-06-30 RX ORDER — METOPROLOL SUCCINATE 25 MG/1
25 TABLET, EXTENDED RELEASE ORAL DAILY
Qty: 90 TABLET | Refills: 1 | Status: SHIPPED | OUTPATIENT
Start: 2021-06-30 | End: 2021-12-15

## 2021-07-14 RX ORDER — GEMFIBROZIL 600 MG/1
TABLET, FILM COATED ORAL
Qty: 60 TABLET | Refills: 1 | Status: SHIPPED | OUTPATIENT
Start: 2021-07-14 | End: 2021-09-20

## 2021-07-22 ENCOUNTER — TELEPHONE (OUTPATIENT)
Dept: FAMILY MEDICINE CLINIC | Facility: CLINIC | Age: 61
End: 2021-07-22

## 2021-07-22 RX ORDER — ALPRAZOLAM 0.25 MG/1
0.25 TABLET ORAL 2 TIMES DAILY PRN
Qty: 20 TABLET | Refills: 0 | Status: SHIPPED | OUTPATIENT
Start: 2021-07-22 | End: 2021-09-14

## 2021-07-22 NOTE — TELEPHONE ENCOUNTER
Caller: Nimco Mccracken    Relationship: Self    Best call back number: [phone]     What medication are you requesting: LOW DOSE OF XANAX. PATIENT STATES SHE IS TRAVELING NEXT WEEK AND GETS BAD ANXIETY. PATIENT STATES .25 MG OF XANAX WAS PRESCRIBED 4 YEARS AGO WHEN SHE TRAVELED AND SHE ONLY TOOK HALF AT A TIME     Have you had these symptoms before:    [x] Yes  [] No    Have you been treated for these symptoms before:   [x] Yes  [] No    If a prescription is needed, what is your preferred pharmacy and phone number:    [id number] - [place], [place] - [address] AT [facility] [address] - [phone]  - [phone] FX

## 2021-07-23 ENCOUNTER — OFFICE (AMBULATORY)
Dept: URBAN - METROPOLITAN AREA PATHOLOGY 4 | Facility: PATHOLOGY | Age: 61
End: 2021-07-23

## 2021-07-23 ENCOUNTER — ON CAMPUS - OUTPATIENT (AMBULATORY)
Dept: URBAN - METROPOLITAN AREA HOSPITAL 2 | Facility: HOSPITAL | Age: 61
End: 2021-07-23
Payer: COMMERCIAL

## 2021-07-23 ENCOUNTER — TELEPHONE (OUTPATIENT)
Dept: FAMILY MEDICINE CLINIC | Facility: CLINIC | Age: 61
End: 2021-07-23

## 2021-07-23 VITALS
OXYGEN SATURATION: 97 % | OXYGEN SATURATION: 98 % | OXYGEN SATURATION: 99 % | DIASTOLIC BLOOD PRESSURE: 79 MMHG | HEART RATE: 86 BPM | TEMPERATURE: 97.5 F | DIASTOLIC BLOOD PRESSURE: 70 MMHG | WEIGHT: 129 LBS | DIASTOLIC BLOOD PRESSURE: 76 MMHG | HEART RATE: 91 BPM | SYSTOLIC BLOOD PRESSURE: 150 MMHG | RESPIRATION RATE: 16 BRPM | DIASTOLIC BLOOD PRESSURE: 87 MMHG | DIASTOLIC BLOOD PRESSURE: 89 MMHG | DIASTOLIC BLOOD PRESSURE: 91 MMHG | HEART RATE: 93 BPM | SYSTOLIC BLOOD PRESSURE: 119 MMHG | RESPIRATION RATE: 17 BRPM | SYSTOLIC BLOOD PRESSURE: 151 MMHG | DIASTOLIC BLOOD PRESSURE: 81 MMHG | SYSTOLIC BLOOD PRESSURE: 139 MMHG | SYSTOLIC BLOOD PRESSURE: 135 MMHG | SYSTOLIC BLOOD PRESSURE: 126 MMHG | SYSTOLIC BLOOD PRESSURE: 129 MMHG | DIASTOLIC BLOOD PRESSURE: 53 MMHG | SYSTOLIC BLOOD PRESSURE: 99 MMHG | HEART RATE: 82 BPM | HEIGHT: 60 IN | HEART RATE: 61 BPM | HEART RATE: 83 BPM | OXYGEN SATURATION: 100 % | HEART RATE: 98 BPM

## 2021-07-23 DIAGNOSIS — D12.4 BENIGN NEOPLASM OF DESCENDING COLON: ICD-10-CM

## 2021-07-23 DIAGNOSIS — Z86.010 PERSONAL HISTORY OF COLONIC POLYPS: ICD-10-CM

## 2021-07-23 DIAGNOSIS — K64.1 SECOND DEGREE HEMORRHOIDS: ICD-10-CM

## 2021-07-23 PROBLEM — K63.5 POLYP OF COLON: Status: ACTIVE | Noted: 2021-07-23

## 2021-07-23 LAB
GI HISTOLOGY: A. UNSPECIFIED: (no result)
GI HISTOLOGY: PDF REPORT: (no result)

## 2021-07-23 PROCEDURE — 45385 COLONOSCOPY W/LESION REMOVAL: CPT | Mod: 33 | Performed by: INTERNAL MEDICINE

## 2021-07-23 PROCEDURE — 88305 TISSUE EXAM BY PATHOLOGIST: CPT | Mod: 33 | Performed by: INTERNAL MEDICINE

## 2021-07-23 NOTE — TELEPHONE ENCOUNTER
Spoke with pharmacist and called into the [facility] [place]    Also called [facility] in [place] and left message letting them know to cancel xanax order there.  LEE

## 2021-07-23 NOTE — TELEPHONE ENCOUNTER
Pt called stating that [facility] in [place] is closed until Monday, so they sent all of her medications to the [facility] [place] except for xanax. Pt states she needs this sent today.

## 2021-08-02 ENCOUNTER — TELEPHONE (OUTPATIENT)
Dept: FAMILY MEDICINE CLINIC | Facility: CLINIC | Age: 61
End: 2021-08-02

## 2021-08-02 DIAGNOSIS — Z12.31 OTHER SCREENING MAMMOGRAM: Primary | ICD-10-CM

## 2021-08-02 NOTE — TELEPHONE ENCOUNTER
Caller: Nimco Mccracken    Relationship: Self    Best call back number: [phone] (H) OR CALL M [phone]    What orders are you requesting (i.e. lab or imaging): YEARLY MAMMOGRAM    In what timeframe would the patient need to come in: PATIENT WANTS TO MAKE HER OWN APPOINTMENT WHEN ORDER IS READY    Where will you receive your lab/imaging services: [facility]    Additional notes: PLEASE ADVISE PATIENT WHEN ORDER IS READY FOR YEARLY MAMMOGRAM, PATIENT WOULD LIKE TO MAKE HER OWN APPOINTMENT SO SHE CAN DO IT THE SAME DAY AS HER PAP SMEAR

## 2021-08-13 RX ORDER — PANTOPRAZOLE SODIUM 40 MG/1
40 TABLET, DELAYED RELEASE ORAL DAILY
Qty: 30 TABLET | Refills: 3 | Status: SHIPPED | OUTPATIENT
Start: 2021-08-13 | End: 2021-12-15

## 2021-09-14 ENCOUNTER — OFFICE VISIT (OUTPATIENT)
Dept: FAMILY MEDICINE CLINIC | Facility: CLINIC | Age: 61
End: 2021-09-14

## 2021-09-14 VITALS
BODY MASS INDEX: 24.66 KG/M2 | OXYGEN SATURATION: 95 % | DIASTOLIC BLOOD PRESSURE: 76 MMHG | HEART RATE: 101 BPM | HEIGHT: 61 IN | TEMPERATURE: 97.5 F | WEIGHT: 130.6 LBS | SYSTOLIC BLOOD PRESSURE: 136 MMHG

## 2021-09-14 DIAGNOSIS — J01.01 ACUTE RECURRENT MAXILLARY SINUSITIS: ICD-10-CM

## 2021-09-14 DIAGNOSIS — J30.1 ALLERGIC RHINITIS DUE TO POLLEN, UNSPECIFIED SEASONALITY: Primary | ICD-10-CM

## 2021-09-14 PROCEDURE — 99213 OFFICE O/P EST LOW 20 MIN: CPT | Performed by: NURSE PRACTITIONER

## 2021-09-14 RX ORDER — VITAMIN B COMPLEX
1 CAPSULE ORAL DAILY
COMMUNITY

## 2021-09-14 RX ORDER — MULTIVIT WITH MINERALS/LUTEIN
500 TABLET ORAL DAILY
COMMUNITY

## 2021-09-14 RX ORDER — VITAMIN E 268 MG
400 CAPSULE ORAL DAILY
COMMUNITY

## 2021-09-14 RX ORDER — AZITHROMYCIN 250 MG/1
TABLET, FILM COATED ORAL
Qty: 6 TABLET | Refills: 2 | Status: SHIPPED | OUTPATIENT
Start: 2021-09-14 | End: 2021-10-06

## 2021-09-14 RX ORDER — PSEUDOEPHEDRINE HCL 30 MG
30 TABLET ORAL EVERY 4 HOURS PRN
Qty: 60 TABLET | Refills: 2 | Status: SHIPPED | OUTPATIENT
Start: 2021-09-14 | End: 2022-12-18

## 2021-09-14 NOTE — PATIENT INSTRUCTIONS
Z-Tom  Sudafed 30 4 times daily along with home Nasacort and Allegra  Marion pot  Call if any new symptoms develop  Wait 1 week to get second Pfizer vaccine

## 2021-09-14 NOTE — PROGRESS NOTES
"    Nimco Mccracken is a 61 y.o. female.     61-year-old white female smoker with history of COPD, hypertension, carpal tunnel, GERD, anemia and anxiety who comes in today with complaints of nasal congestion frontal headache and sore throat since Saturday.  She did a over-the-counter Covid test today that was negative.  She got her first Pfizer vaccine 3 weeks ago reduce dose to get her second 1 on Thursday.  Exam reveals severe allergic rhinitis and some redness of the turbinate on the right nare.  I am placing her on allergy medication and a Z-Tom and advised she might want a wait 1 week before getting a second Pfizer vaccine.  I instructed patient if she starts developing other symptoms to let me know    Vital signs are stable and she is up-to-date on all other preventative maintenance                Z-Tom  Sudafed 30 4 times daily along with home Nasacort and Allegra  [place] pot  Call if any new symptoms develop  Wait 1 week to get second Pfizer vaccine       The following portions of the patient's history were reviewed and updated as appropriate: allergies, current medications, past family history, past medical history, past social history, past surgical history and problem list.    Vitals:    09/14/21 1400   BP: 136/76   BP Location: Right arm   Patient Position: Sitting   Cuff Size: Adult   Pulse: 101   Temp: 97.5 °F (36.4 °C)   TempSrc: Temporal   SpO2: 95%   Weight: 59.2 kg (130 lb 9.6 oz)   Height: 154.9 cm (61\")     Body mass index is 24.68 kg/m².    Past Medical History:   Diagnosis Date   • Acute sinusitis    • Acute upper respiratory infection    • Allergic rhinitis due to pollen    • Anxiety    • Body aches    • Carpal tunnel syndrome    • Cigarette smoker    • Cough    • Dysconjugate gaze    • Dysfunction of eustachian tube    • Dyslipidemia    • Dysmenorrhea    • Fasting hyperglycemia    • GERD (gastroesophageal reflux disease)    • Hand pain, left    • Hemorrhoids    • Hx of abnormal cervical " Pap smear    • Hypertension    • Irregular menses    • Leg pain, bilateral    • Nodule of upper lobe of right lung     BENIGN   • Paresthesia of both hands    • Post-menopausal    • RAD (reactive airway disease)    • Skin lesions 06/2007    B9,L BACK,L HIP   • Strabismus    • Trigger finger of right thumb    • Vertigo    • Viral syndrome      Past Surgical History:   Procedure Laterality Date   • CARPAL TUNNEL RELEASE Right 08/2014   • COLONOSCOPY     • ENDOSCOPY  07/21/2006    DR [facility] B9;DR LOZANO     Family History   Problem Relation Age of Onset   • Breast cancer Mother         mid 30's, passed at 38   • Breast cancer Daughter         early 30's, BRCA positive   • Diabetes Other    • Heart disease Other    • Dementia Other    • COPD Other      Immunization History   Administered Date(s) Administered   • Pneumococcal Polysaccharide (PPSV23) 02/21/2014, 10/06/2020       Results Encounter on 04/11/2021   Component Date Value Ref Range Status   • WBC 04/08/2021 8.7  3.4 - 10.8 x10E3/uL Final   • RBC 04/08/2021 4.41  3.77 - 5.28 x10E6/uL Final   • Hemoglobin 04/08/2021 13.1  11.1 - 15.9 g/dL Final   • Hematocrit 04/08/2021 39.0  34.0 - 46.6 % Final   • MCV 04/08/2021 88  79 - 97 fL Final   • MCH 04/08/2021 29.7  26.6 - 33.0 pg Final   • MCHC 04/08/2021 33.6  31.5 - 35.7 g/dL Final   • RDW 04/08/2021 12.4  11.7 - 15.4 % Final   • Platelets 04/08/2021 403  150 - 450 x10E3/uL Final   • Neutrophil Rel % 04/08/2021 50  Not Estab. % Final   • Lymphocyte Rel % 04/08/2021 38  Not Estab. % Final   • Monocyte Rel % 04/08/2021 8  Not Estab. % Final   • Eosinophil Rel % 04/08/2021 3  Not Estab. % Final   • Basophil Rel % 04/08/2021 1  Not Estab. % Final   • Neutrophils Absolute 04/08/2021 4.4  1.4 - 7.0 x10E3/uL Final   • Lymphocytes Absolute 04/08/2021 3.3* 0.7 - 3.1 x10E3/uL Final   • Monocytes Absolute 04/08/2021 0.7  0.1 - 0.9 x10E3/uL Final   • Eosinophils Absolute 04/08/2021 0.2  0.0 - 0.4 x10E3/uL Final   •  Basophils Absolute 04/08/2021 0.1  0.0 - 0.2 x10E3/uL Final   • Immature Granulocyte Rel % 04/08/2021 0  Not Estab. % Final   • Immature Grans Absolute 04/08/2021 0.0  0.0 - 0.1 x10E3/uL Final   • Glucose 04/08/2021 93  65 - 99 mg/dL Final   • BUN 04/08/2021 13  6 - 24 mg/dL Final   • Creatinine 04/08/2021 0.72  0.57 - 1.00 mg/dL Final   • eGFR Non  Am 04/08/2021 93  >59 mL/min/1.73 Final   • eGFR African Am 04/08/2021 107  >59 mL/min/1.73 Final   • BUN/Creatinine Ratio 04/08/2021 18  9 - 23 Final   • Sodium 04/08/2021 141  134 - 144 mmol/L Final   • Potassium 04/08/2021 4.3  3.5 - 5.2 mmol/L Final   • Chloride 04/08/2021 105  96 - 106 mmol/L Final   • Total CO2 04/08/2021 22  20 - 29 mmol/L Final   • Calcium 04/08/2021 9.7  8.7 - 10.2 mg/dL Final   • Total Protein 04/08/2021 7.0  6.0 - 8.5 g/dL Final   • Albumin 04/08/2021 4.4  3.8 - 4.9 g/dL Final   • Globulin 04/08/2021 2.6  1.5 - 4.5 g/dL Final   • A/G Ratio 04/08/2021 1.7  1.2 - 2.2 Final   • Total Bilirubin 04/08/2021 0.3  0.0 - 1.2 mg/dL Final   • Alkaline Phosphatase 04/08/2021 128* 39 - 117 IU/L Final   • AST (SGOT) 04/08/2021 11  0 - 40 IU/L Final   • ALT (SGPT) 04/08/2021 6  0 - 32 IU/L Final   • Total Cholesterol 04/08/2021 227* 100 - 199 mg/dL Final   • Triglycerides 04/08/2021 66  0 - 149 mg/dL Final   • HDL Cholesterol 04/08/2021 54  >39 mg/dL Final   • VLDL Cholesterol Denis 04/08/2021 11  5 - 40 mg/dL Final   • LDL Chol Calc ([facility]) 04/08/2021 162* 0 - 99 mg/dL Final   • LDL/HDL RATIO 04/08/2021 3.0  0.0 - 3.2 ratio Final    Comment:                                     LDL/HDL Ratio                                              Men  Women                                1/2 Avg.Risk  1.0    1.5                                    Avg.Risk  3.6    3.2                                 2X Avg.Risk  6.2    5.0                                 3X Avg.Risk  8.0    6.1     • Hemoglobin A1C 04/08/2021 5.9* 4.8 - 5.6 % Final    Comment:          Prediabetes:  5.7 - 6.4           Diabetes: >6.4           Glycemic control for adults with diabetes: <7.0           Review of Systems   Constitutional: Negative.    HENT: Positive for congestion, postnasal drip, sinus pressure and sore throat.    Respiratory: Positive for cough.    Cardiovascular: Negative.    Gastrointestinal: Negative.    Genitourinary: Negative.    Musculoskeletal: Negative.    Skin: Negative.    Neurological: Negative.    Hematological: Negative.    Psychiatric/Behavioral: Negative.        Objective   Physical Exam  Constitutional:       Appearance: Normal appearance.   HENT:      Head: Normocephalic.      Comments: Frontal sinus pressure     Ears:      Comments: Bilateral TMs bulging clear fluid     Nose: Rhinorrhea present.      Comments: Right terminate with     Mouth/Throat:      Comments: Heavy postnasal drip  Cardiovascular:      Rate and Rhythm: Normal rate and regular rhythm.      Pulses: Normal pulses.      Heart sounds: Normal heart sounds.   Pulmonary:      Effort: Pulmonary effort is normal.      Breath sounds: Normal breath sounds.   Abdominal:      General: Bowel sounds are normal.   Musculoskeletal:         General: Normal range of motion.   Skin:     General: Skin is warm and dry.   Neurological:      General: No focal deficit present.      Mental Status: She is alert and oriented to person, place, and time.   Psychiatric:         Mood and Affect: Mood normal.         Behavior: Behavior normal.         Procedures    Assessment/Plan   Problems Addressed this Visit        Allergies and Adverse Reactions    Allergic rhinitis due to pollen - Primary       ENT    Acute sinusitis      Diagnoses       Codes Comments    Allergic rhinitis due to pollen, unspecified seasonality    -  Primary ICD-10-CM: J30.1  ICD-9-CM: 477.0     Acute recurrent maxillary sinusitis     ICD-10-CM: J01.01  ICD-9-CM: 461.0             Current Outpatient Medications:   •  B Complex Vitamins (vitamin b complex) capsule  capsule, Take 1 capsule by mouth Daily., Disp: , Rfl:   •  fosinopril (MONOPRIL) 40 MG tablet, TAKE 1 TABLET BY MOUTH DAILY, Disp: 90 tablet, Rfl: 1  •  gemfibrozil (LOPID) 600 MG tablet, TAKE 1 TABLET BY MOUTH TWICE DAILY, Disp: 60 tablet, Rfl: 1  •  metoprolol succinate XL (TOPROL-XL) 25 MG 24 hr tablet, TAKE 1 TABLET BY MOUTH DAILY, Disp: 90 tablet, Rfl: 1  •  pantoprazole (PROTONIX) 40 MG EC tablet, TAKE 1 TABLET BY MOUTH DAILY, Disp: 30 tablet, Rfl: 3  •  vitamin C (ASCORBIC ACID) 250 MG tablet, Take 500 mg by mouth Daily., Disp: , Rfl:   •  vitamin E 400 UNIT capsule, Take 400 Units by mouth Daily., Disp: , Rfl:   •  azithromycin (Zithromax Z-Tom) 250 MG tablet, Take 2 tablets the first day, then 1 tablet daily for 4 days., Disp: 6 tablet, Rfl: 2  •  pseudoephedrine (Sudafed) 30 MG tablet, Take 1 tablet by mouth Every 4 (Four) Hours As Needed for Congestion., Disp: 60 tablet, Rfl: 2

## 2021-09-20 RX ORDER — FOSINOPRIL SODIUM 40 MG/1
40 TABLET ORAL DAILY
Qty: 90 TABLET | Refills: 1 | Status: SHIPPED | OUTPATIENT
Start: 2021-09-20 | End: 2022-05-07

## 2021-09-20 RX ORDER — GEMFIBROZIL 600 MG/1
TABLET, FILM COATED ORAL
Qty: 180 TABLET | Refills: 1 | Status: SHIPPED | OUTPATIENT
Start: 2021-09-20 | End: 2022-04-12 | Stop reason: SDUPTHER

## 2021-10-06 ENCOUNTER — OFFICE VISIT (OUTPATIENT)
Dept: FAMILY MEDICINE CLINIC | Facility: CLINIC | Age: 61
End: 2021-10-06

## 2021-10-06 VITALS
HEART RATE: 89 BPM | RESPIRATION RATE: 16 BRPM | HEIGHT: 61 IN | SYSTOLIC BLOOD PRESSURE: 139 MMHG | TEMPERATURE: 98.2 F | BODY MASS INDEX: 24.81 KG/M2 | WEIGHT: 131.4 LBS | OXYGEN SATURATION: 99 % | DIASTOLIC BLOOD PRESSURE: 84 MMHG

## 2021-10-06 DIAGNOSIS — R73.01 FASTING HYPERGLYCEMIA: ICD-10-CM

## 2021-10-06 DIAGNOSIS — F17.210 CIGARETTE SMOKER: ICD-10-CM

## 2021-10-06 DIAGNOSIS — E78.5 DYSLIPIDEMIA: Primary | ICD-10-CM

## 2021-10-06 PROCEDURE — 99213 OFFICE O/P EST LOW 20 MIN: CPT | Performed by: NURSE PRACTITIONER

## 2021-10-06 NOTE — PROGRESS NOTES
"    Nimco Mccracken is a 61 y.o. female.     61-year-old white female smoker with history of COPD, hypertension, carpal tunnel, GERD, anemia, and anxiety who comes in today for follow-up visit    Blood pressure 138/84 heart rate 88 she denies any chest pain, dyspnea, tachycardia or dizziness  Weight is basically unchanged at 131 with a BMI of 24.8    Patient has no new complaints.  She is up-to-date on Covid vaccines eye exams mammogram Pap smears and colonoscopy  Patient's last A1c 5.9 cholesterol 227           Fasting blood work today  Stop smoking  Follow-up 6 months       The following portions of the patient's history were reviewed and updated as appropriate: allergies, current medications, past family history, past medical history, past social history, past surgical history and problem list.    Vitals:    10/06/21 0809   BP: 139/84   BP Location: Right arm   Patient Position: Sitting   Cuff Size: Adult   Pulse: 89   Resp: 16   Temp: 98.2 °F (36.8 °C)   TempSrc: Infrared   SpO2: 99%   Weight: 59.6 kg (131 lb 6.4 oz)   Height: 154.9 cm (61\")     Body mass index is 24.83 kg/m².    Past Medical History:   Diagnosis Date   • Acute sinusitis    • Acute upper respiratory infection    • Allergic rhinitis due to pollen    • Anxiety    • Body aches    • Carpal tunnel syndrome    • Cigarette smoker    • Cough    • Dysconjugate gaze    • Dysfunction of eustachian tube    • Dyslipidemia    • Dysmenorrhea    • Fasting hyperglycemia    • GERD (gastroesophageal reflux disease)    • Hand pain, left    • Hemorrhoids    • Hx of abnormal cervical Pap smear    • Hypertension    • Irregular menses    • Leg pain, bilateral    • Nodule of upper lobe of right lung     BENIGN   • Paresthesia of both hands    • Post-menopausal    • RAD (reactive airway disease)    • Skin lesions 06/2007    B9,L BACK,L HIP   • Strabismus    • Trigger finger of right thumb    • Vertigo    • Viral syndrome      Past Surgical History:   Procedure " Laterality Date   • CARPAL TUNNEL RELEASE Right 08/2014   • COLONOSCOPY     • ENDOSCOPY  07/21/2006    [facility] B9;DR LOZANO     Family History   Problem Relation Age of Onset   • Breast cancer Mother         mid 30's, passed at 38   • Breast cancer Daughter         early 30's, BRCA positive   • Diabetes Other    • Heart disease Other    • Dementia Other    • COPD Other      Immunization History   Administered Date(s) Administered   • COVID-19 (PFIZER) 08/26/2021   • Pneumococcal Polysaccharide (PPSV23) 02/21/2014, 10/06/2020       Results Encounter on 04/11/2021   Component Date Value Ref Range Status   • WBC 04/08/2021 8.7  3.4 - 10.8 x10E3/uL Final   • RBC 04/08/2021 4.41  3.77 - 5.28 x10E6/uL Final   • Hemoglobin 04/08/2021 13.1  11.1 - 15.9 g/dL Final   • Hematocrit 04/08/2021 39.0  34.0 - 46.6 % Final   • MCV 04/08/2021 88  79 - 97 fL Final   • MCH 04/08/2021 29.7  26.6 - 33.0 pg Final   • MCHC 04/08/2021 33.6  31.5 - 35.7 g/dL Final   • RDW 04/08/2021 12.4  11.7 - 15.4 % Final   • Platelets 04/08/2021 403  150 - 450 x10E3/uL Final   • Neutrophil Rel % 04/08/2021 50  Not Estab. % Final   • Lymphocyte Rel % 04/08/2021 38  Not Estab. % Final   • Monocyte Rel % 04/08/2021 8  Not Estab. % Final   • Eosinophil Rel % 04/08/2021 3  Not Estab. % Final   • Basophil Rel % 04/08/2021 1  Not Estab. % Final   • Neutrophils Absolute 04/08/2021 4.4  1.4 - 7.0 x10E3/uL Final   • Lymphocytes Absolute 04/08/2021 3.3* 0.7 - 3.1 x10E3/uL Final   • Monocytes Absolute 04/08/2021 0.7  0.1 - 0.9 x10E3/uL Final   • Eosinophils Absolute 04/08/2021 0.2  0.0 - 0.4 x10E3/uL Final   • Basophils Absolute 04/08/2021 0.1  0.0 - 0.2 x10E3/uL Final   • Immature Granulocyte Rel % 04/08/2021 0  Not Estab. % Final   • Immature Grans Absolute 04/08/2021 0.0  0.0 - 0.1 x10E3/uL Final   • Glucose 04/08/2021 93  65 - 99 mg/dL Final   • BUN 04/08/2021 13  6 - 24 mg/dL Final   • Creatinine 04/08/2021 0.72  0.57 - 1.00 mg/dL Final   • eGFR Non   Am 04/08/2021 93  >59 mL/min/1.73 Final   • eGFR African Am 04/08/2021 107  >59 mL/min/1.73 Final   • BUN/Creatinine Ratio 04/08/2021 18  9 - 23 Final   • Sodium 04/08/2021 141  134 - 144 mmol/L Final   • Potassium 04/08/2021 4.3  3.5 - 5.2 mmol/L Final   • Chloride 04/08/2021 105  96 - 106 mmol/L Final   • Total CO2 04/08/2021 22  20 - 29 mmol/L Final   • Calcium 04/08/2021 9.7  8.7 - 10.2 mg/dL Final   • Total Protein 04/08/2021 7.0  6.0 - 8.5 g/dL Final   • Albumin 04/08/2021 4.4  3.8 - 4.9 g/dL Final   • Globulin 04/08/2021 2.6  1.5 - 4.5 g/dL Final   • A/G Ratio 04/08/2021 1.7  1.2 - 2.2 Final   • Total Bilirubin 04/08/2021 0.3  0.0 - 1.2 mg/dL Final   • Alkaline Phosphatase 04/08/2021 128* 39 - 117 IU/L Final   • AST (SGOT) 04/08/2021 11  0 - 40 IU/L Final   • ALT (SGPT) 04/08/2021 6  0 - 32 IU/L Final   • Total Cholesterol 04/08/2021 227* 100 - 199 mg/dL Final   • Triglycerides 04/08/2021 66  0 - 149 mg/dL Final   • HDL Cholesterol 04/08/2021 54  >39 mg/dL Final   • VLDL Cholesterol Denis 04/08/2021 11  5 - 40 mg/dL Final   • LDL Chol Calc ([facility]) 04/08/2021 162* 0 - 99 mg/dL Final   • LDL/HDL RATIO 04/08/2021 3.0  0.0 - 3.2 ratio Final    Comment:                                     LDL/HDL Ratio                                              Men  Women                                1/2 Avg.Risk  1.0    1.5                                    Avg.Risk  3.6    3.2                                 2X Avg.Risk  6.2    5.0                                 3X Avg.Risk  8.0    6.1     • Hemoglobin A1C 04/08/2021 5.9* 4.8 - 5.6 % Final    Comment:          Prediabetes: 5.7 - 6.4           Diabetes: >6.4           Glycemic control for adults with diabetes: <7.0           Review of Systems    Objective   Physical Exam    Procedures    Assessment/Plan   Diagnoses and all orders for this visit:    1. Dyslipidemia (Primary)  -     Lipid Panel With LDL / HDL Ratio    2. Fasting hyperglycemia  -     Comprehensive  Metabolic Panel    3. Cigarette smoker    4. BMI 24.0-24.9, adult          Current Outpatient Medications:   •  fosinopril (MONOPRIL) 40 MG tablet, TAKE 1 TABLET BY MOUTH DAILY, Disp: 90 tablet, Rfl: 1  •  gemfibrozil (LOPID) 600 MG tablet, TAKE 1 TABLET BY MOUTH TWICE DAILY, Disp: 180 tablet, Rfl: 1  •  metoprolol succinate XL (TOPROL-XL) 25 MG 24 hr tablet, TAKE 1 TABLET BY MOUTH DAILY, Disp: 90 tablet, Rfl: 1  •  pantoprazole (PROTONIX) 40 MG EC tablet, TAKE 1 TABLET BY MOUTH DAILY, Disp: 30 tablet, Rfl: 3  •  pseudoephedrine (Sudafed) 30 MG tablet, Take 1 tablet by mouth Every 4 (Four) Hours As Needed for Congestion., Disp: 60 tablet, Rfl: 2  •  vitamin C (ASCORBIC ACID) 250 MG tablet, Take 500 mg by mouth Daily., Disp: , Rfl:   •  vitamin E 400 UNIT capsule, Take 400 Units by mouth Daily., Disp: , Rfl:   •  B Complex Vitamins (vitamin b complex) capsule capsule, Take 1 capsule by mouth Daily., Disp: , Rfl:

## 2021-10-07 LAB
ALBUMIN SERPL-MCNC: 4.8 G/DL (ref 3.8–4.9)
ALBUMIN/GLOB SERPL: 1.8 {RATIO} (ref 1.2–2.2)
ALP SERPL-CCNC: 124 IU/L (ref 44–121)
ALT SERPL-CCNC: 14 IU/L (ref 0–32)
AST SERPL-CCNC: 21 IU/L (ref 0–40)
BILIRUB SERPL-MCNC: 0.2 MG/DL (ref 0–1.2)
BUN SERPL-MCNC: 12 MG/DL (ref 6–24)
BUN/CREAT SERPL: 18 (ref 9–23)
CALCIUM SERPL-MCNC: 9.9 MG/DL (ref 8.7–10.2)
CHLORIDE SERPL-SCNC: 105 MMOL/L (ref 96–106)
CHOLEST SERPL-MCNC: 234 MG/DL (ref 100–199)
CO2 SERPL-SCNC: 24 MMOL/L (ref 20–29)
CREAT SERPL-MCNC: 0.67 MG/DL (ref 0.57–1)
GLOBULIN SER CALC-MCNC: 2.6 G/DL (ref 1.5–4.5)
GLUCOSE SERPL-MCNC: 93 MG/DL (ref 65–99)
HDLC SERPL-MCNC: 57 MG/DL
LDLC SERPL CALC-MCNC: 166 MG/DL (ref 0–99)
LDLC/HDLC SERPL: 2.9 RATIO (ref 0–3.2)
POTASSIUM SERPL-SCNC: 4.1 MMOL/L (ref 3.5–5.2)
PROT SERPL-MCNC: 7.4 G/DL (ref 6–8.5)
SODIUM SERPL-SCNC: 143 MMOL/L (ref 134–144)
TRIGL SERPL-MCNC: 64 MG/DL (ref 0–149)
VLDLC SERPL CALC-MCNC: 11 MG/DL (ref 5–40)

## 2021-10-13 ENCOUNTER — HOSPITAL ENCOUNTER (OUTPATIENT)
Dept: MAMMOGRAPHY | Facility: HOSPITAL | Age: 61
Discharge: HOME | End: 2021-10-13
Admitting: NURSE PRACTITIONER

## 2021-10-13 DIAGNOSIS — Z12.31 OTHER SCREENING MAMMOGRAM: ICD-10-CM

## 2021-10-13 PROCEDURE — 77063 BREAST TOMOSYNTHESIS BI: CPT

## 2021-10-13 PROCEDURE — 77067 SCR MAMMO BI INCL CAD: CPT

## 2021-12-15 RX ORDER — METOPROLOL SUCCINATE 25 MG/1
25 TABLET, EXTENDED RELEASE ORAL DAILY
Qty: 90 TABLET | Refills: 1 | Status: SHIPPED | OUTPATIENT
Start: 2021-12-15 | End: 2022-06-06

## 2021-12-15 RX ORDER — PANTOPRAZOLE SODIUM 40 MG/1
40 TABLET, DELAYED RELEASE ORAL DAILY
Qty: 30 TABLET | Refills: 3 | Status: SHIPPED | OUTPATIENT
Start: 2021-12-15 | End: 2022-04-06

## 2022-03-07 RX ORDER — GEMFIBROZIL 600 MG/1
TABLET, FILM COATED ORAL
Qty: 180 TABLET | Refills: 1 | OUTPATIENT
Start: 2022-03-07

## 2022-04-06 RX ORDER — PANTOPRAZOLE SODIUM 40 MG/1
40 TABLET, DELAYED RELEASE ORAL DAILY
Qty: 30 TABLET | Refills: 3 | Status: SHIPPED | OUTPATIENT
Start: 2022-04-06 | End: 2022-08-02

## 2022-04-12 RX ORDER — GEMFIBROZIL 600 MG/1
600 TABLET, FILM COATED ORAL 2 TIMES DAILY
Qty: 180 TABLET | Refills: 1 | Status: SHIPPED | OUTPATIENT
Start: 2022-04-12 | End: 2022-09-01

## 2022-04-14 ENCOUNTER — OFFICE VISIT (OUTPATIENT)
Dept: FAMILY MEDICINE CLINIC | Facility: CLINIC | Age: 62
End: 2022-04-14

## 2022-04-14 VITALS
HEIGHT: 61 IN | DIASTOLIC BLOOD PRESSURE: 84 MMHG | SYSTOLIC BLOOD PRESSURE: 135 MMHG | BODY MASS INDEX: 24.84 KG/M2 | HEART RATE: 89 BPM | WEIGHT: 131.6 LBS | TEMPERATURE: 97.4 F | OXYGEN SATURATION: 100 %

## 2022-04-14 DIAGNOSIS — M54.41 ACUTE RIGHT-SIDED LOW BACK PAIN WITH RIGHT-SIDED SCIATICA: ICD-10-CM

## 2022-04-14 DIAGNOSIS — Z00.00 PREVENTATIVE HEALTH CARE: ICD-10-CM

## 2022-04-14 DIAGNOSIS — E78.5 DYSLIPIDEMIA: Primary | ICD-10-CM

## 2022-04-14 DIAGNOSIS — R73.01 FASTING HYPERGLYCEMIA: ICD-10-CM

## 2022-04-14 DIAGNOSIS — F17.210 CIGARETTE SMOKER: ICD-10-CM

## 2022-04-14 PROCEDURE — 99214 OFFICE O/P EST MOD 30 MIN: CPT | Performed by: NURSE PRACTITIONER

## 2022-04-14 RX ORDER — IBUPROFEN 800 MG/1
800 TABLET ORAL EVERY 8 HOURS PRN
Qty: 30 TABLET | Refills: 3 | Status: SHIPPED | OUTPATIENT
Start: 2022-04-14

## 2022-04-14 RX ORDER — CYCLOBENZAPRINE HCL 10 MG
10 TABLET ORAL 3 TIMES DAILY PRN
Qty: 30 TABLET | Refills: 2 | Status: SHIPPED | OUTPATIENT
Start: 2022-04-14

## 2022-04-14 RX ORDER — PREDNISONE 10 MG/1
TABLET ORAL
Qty: 19 TABLET | Refills: 0 | Status: SHIPPED | OUTPATIENT
Start: 2022-04-14 | End: 2022-04-26

## 2022-04-14 NOTE — PROGRESS NOTES
"    Nimco Mccracken is a 61 y.o. female.     61-year-old white female smoker with history of COPD, hypertension, carpal tunnel, GERD, anemia and anxiety who comes in today for follow-up visit    Blood pressure 134/84 heart rate 88 she denies any chest pain, dyspnea, tachycardia or dizziness    Patient's main complaint today is right-sided low back pain with radiculopathy this been going on for about 2 weeks.  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  I am getting an x-ray today placed her on medications and instructed her that she cannot do any lifting while on these medications to try to let her back heal.  If no resolution with medication will schedule physical therapy and try to get an MRI    Patient's last lab work was mildly elevated A1c 5.9 cholesterol 234  alkaline phosphate 124.  Her weight is up a pound at 132 with a BMI 24.9.  She has had 2 COVID vaccines up-to-date on eye exam and colonoscopy is due in 2028.  Her mammogram and bone scan are coming up in October 2022          Fasting blood work  Lumbar x-ray  Prednisone 10 mg taper dose  Flexeril 10 mg 3 times a day monitor for drowsiness  Ibuprofen 800 mg 3 times daily with food May take with Tylenol  No lifting  Heat and ice  Call office if no resolution in pain after medications       The following portions of the patient's history were reviewed and updated as appropriate: allergies, current medications, past family history, past medical history, past social history, past surgical history and problem list.    Vitals:    04/14/22 0801   BP: 135/84   BP Location: Right arm   Patient Position: Sitting   Cuff Size: Adult   Pulse: 89   Temp: 97.4 °F (36.3 °C)   TempSrc: Temporal   SpO2: 100%   Weight: 59.7 kg (131 lb 9.6 oz)   Height: 154.9 cm (61\")     Body mass index is 24.87 kg/m².    Past Medical History:   Diagnosis Date   • Acute sinusitis    • Acute upper respiratory infection    • Allergic rhinitis due to pollen    • " Anxiety    • Body aches    • Carpal tunnel syndrome    • Cigarette smoker    • Cough    • Dysconjugate gaze    • Dysfunction of eustachian tube    • Dyslipidemia    • Dysmenorrhea    • Fasting hyperglycemia    • GERD (gastroesophageal reflux disease)    • Hand pain, left    • Hemorrhoids    • Hx of abnormal cervical Pap smear    • Hypertension    • Irregular menses    • Leg pain, bilateral    • Nodule of upper lobe of right lung     BENIGN   • Paresthesia of both hands    • Post-menopausal    • RAD (reactive airway disease)    • Skin lesions 06/2007    B9,L BACK,L HIP   • Strabismus    • Trigger finger of right thumb    • Vertigo    • Viral syndrome      Past Surgical History:   Procedure Laterality Date   • CARPAL TUNNEL RELEASE Right 08/2014   • COLONOSCOPY     • ENDOSCOPY  07/21/2006    DR [facility] B9;DR LOZANO     Family History   Problem Relation Age of Onset   • Breast cancer Mother         mid 30's, passed at 38   • Breast cancer Daughter         early 30's, BRCA positive   • Diabetes Other    • Heart disease Other    • Dementia Other    • COPD Other      Immunization History   Administered Date(s) Administered   • COVID-19 (PFIZER) PURPLE CAP 08/26/2021, 09/27/2021   • Pneumococcal Polysaccharide (PPSV23) 02/21/2014, 10/06/2020       Office Visit on 10/06/2021   Component Date Value Ref Range Status   • Glucose 10/06/2021 93  65 - 99 mg/dL Final   • BUN 10/06/2021 12  6 - 24 mg/dL Final   • Creatinine 10/06/2021 0.67  0.57 - 1.00 mg/dL Final   • eGFR Non  Am 10/06/2021 97  >59 mL/min/1.73 Final   • eGFR African Am 10/06/2021 111  >59 mL/min/1.73 Final    Comment: **[facility] currently reports eGFR in compliance with the current**    recommendations of the [employer]. [facility] will    update reporting as new guidelines are published from the NKF-ASN    Task force.     • BUN/Creatinine Ratio 10/06/2021 18  9 - 23 Final   • Sodium 10/06/2021 143  134 - 144 mmol/L Final   • Potassium  10/06/2021 4.1  3.5 - 5.2 mmol/L Final   • Chloride 10/06/2021 105  96 - 106 mmol/L Final   • Total CO2 10/06/2021 24  20 - 29 mmol/L Final   • Calcium 10/06/2021 9.9  8.7 - 10.2 mg/dL Final   • Total Protein 10/06/2021 7.4  6.0 - 8.5 g/dL Final   • Albumin 10/06/2021 4.8  3.8 - 4.9 g/dL Final   • Globulin 10/06/2021 2.6  1.5 - 4.5 g/dL Final   • A/G Ratio 10/06/2021 1.8  1.2 - 2.2 Final   • Total Bilirubin 10/06/2021 0.2  0.0 - 1.2 mg/dL Final   • Alkaline Phosphatase 10/06/2021 124 (A) 44 - 121 IU/L Final                  **Please note reference interval change**   • AST (SGOT) 10/06/2021 21  0 - 40 IU/L Final   • ALT (SGPT) 10/06/2021 14  0 - 32 IU/L Final   • Total Cholesterol 10/06/2021 234 (A) 100 - 199 mg/dL Final   • Triglycerides 10/06/2021 64  0 - 149 mg/dL Final   • HDL Cholesterol 10/06/2021 57  >39 mg/dL Final   • VLDL Cholesterol Denis 10/06/2021 11  5 - 40 mg/dL Final   • LDL Chol Calc (NIH) 10/06/2021 166 (A) 0 - 99 mg/dL Final   • LDL/HDL RATIO 10/06/2021 2.9  0.0 - 3.2 ratio Final    Comment:                                     LDL/HDL Ratio                                              Men  Women                                1/2 Avg.Risk  1.0    1.5                                    Avg.Risk  3.6    3.2                                 2X Avg.Risk  6.2    5.0                                 3X Avg.Risk  8.0    6.1           Review of Systems   Constitutional: Negative.    HENT: Negative.    Respiratory: Negative.    Cardiovascular: Negative.    Gastrointestinal: Negative.    Genitourinary: Negative.    Musculoskeletal: Positive for back pain.   Skin: Negative.    Neurological: Negative.    Psychiatric/Behavioral: Negative.        Objective   Physical Exam  HENT:      Head: Normocephalic.   Cardiovascular:      Rate and Rhythm: Normal rate and regular rhythm.      Pulses: Normal pulses.      Heart sounds: Normal heart sounds.   Pulmonary:      Effort: Pulmonary effort is normal.      Breath sounds:  Normal breath sounds.   Musculoskeletal:      Comments: Low back pain with right-sided radiculopathy   Skin:     General: Skin is warm.   Neurological:      General: No focal deficit present.      Mental Status: She is alert and oriented to person, place, and time.   Psychiatric:         Mood and Affect: Mood normal.         Procedures    Assessment/Plan   Diagnoses and all orders for this visit:    1. Dyslipidemia (Primary)  -     Lipid Panel With LDL / HDL Ratio    2. Fasting hyperglycemia  -     Hemoglobin A1c  -     Comprehensive Metabolic Panel    3. Preventative health care  -     CBC & Differential    4. Acute right-sided low back pain with right-sided sciatica  -     XR Spine Lumbar 2 or 3 View    5. Cigarette smoker    6. BMI 24.0-24.9, adult    Other orders  -     predniSONE (DELTASONE) 10 MG tablet; Take 3 tablets by mouth Daily for 3 days, THEN 2 tablets Daily for 3 days, THEN 1 tablet Daily for 2 days, THEN 0.5 tablets Daily for 4 days.  Dispense: 19 tablet; Refill: 0  -     cyclobenzaprine (FLEXERIL) 10 MG tablet; Take 1 tablet by mouth 3 (Three) Times a Day As Needed for Muscle Spasms.  Dispense: 30 tablet; Refill: 2  -     ibuprofen (ADVIL,MOTRIN) 800 MG tablet; Take 1 tablet by mouth Every 8 (Eight) Hours As Needed for Mild Pain .  Dispense: 30 tablet; Refill: 3          Current Outpatient Medications:   •  B Complex Vitamins (vitamin b complex) capsule capsule, Take 1 capsule by mouth Daily., Disp: , Rfl:   •  fosinopril (MONOPRIL) 40 MG tablet, TAKE 1 TABLET BY MOUTH DAILY, Disp: 90 tablet, Rfl: 1  •  gemfibrozil (LOPID) 600 MG tablet, Take 1 tablet by mouth 2 (Two) Times a Day., Disp: 180 tablet, Rfl: 1  •  metoprolol succinate XL (TOPROL-XL) 25 MG 24 hr tablet, TAKE 1 TABLET BY MOUTH DAILY, Disp: 90 tablet, Rfl: 1  •  pantoprazole (PROTONIX) 40 MG EC tablet, TAKE 1 TABLET BY MOUTH DAILY, Disp: 30 tablet, Rfl: 3  •  pseudoephedrine (Sudafed) 30 MG tablet, Take 1 tablet by mouth Every 4 (Four)  Hours As Needed for Congestion., Disp: 60 tablet, Rfl: 2  •  vitamin C (ASCORBIC ACID) 250 MG tablet, Take 500 mg by mouth Daily., Disp: , Rfl:   •  vitamin E 400 UNIT capsule, Take 400 Units by mouth Daily., Disp: , Rfl:   •  cyclobenzaprine (FLEXERIL) 10 MG tablet, Take 1 tablet by mouth 3 (Three) Times a Day As Needed for Muscle Spasms., Disp: 30 tablet, Rfl: 2  •  ibuprofen (ADVIL,MOTRIN) 800 MG tablet, Take 1 tablet by mouth Every 8 (Eight) Hours As Needed for Mild Pain ., Disp: 30 tablet, Rfl: 3  •  predniSONE (DELTASONE) 10 MG tablet, Take 3 tablets by mouth Daily for 3 days, THEN 2 tablets Daily for 3 days, THEN 1 tablet Daily for 2 days, THEN 0.5 tablets Daily for 4 days., Disp: 19 tablet, Rfl: 0           Juliet Marcos, APRN 4/14/2022 08:22 EDT  This note has been electronically signed

## 2022-04-14 NOTE — PATIENT INSTRUCTIONS
Fasting blood work  Lumbar x-ray  Prednisone 10 mg taper dose  Flexeril 10 mg 3 times a day monitor for drowsiness  Ibuprofen 800 mg 3 times daily with food May take with Tylenol  No lifting  Heat and ice  Call office if no resolution in pain after medications

## 2022-04-15 LAB
ALBUMIN SERPL-MCNC: 4.8 G/DL (ref 3.8–4.9)
ALBUMIN/GLOB SERPL: 1.7 {RATIO} (ref 1.2–2.2)
ALP SERPL-CCNC: 107 IU/L (ref 44–121)
ALT SERPL-CCNC: 10 IU/L (ref 0–32)
AST SERPL-CCNC: 16 IU/L (ref 0–40)
BASOPHILS # BLD AUTO: 0.1 X10E3/UL (ref 0–0.2)
BASOPHILS NFR BLD AUTO: 1 %
BILIRUB SERPL-MCNC: 0.2 MG/DL (ref 0–1.2)
BUN SERPL-MCNC: 13 MG/DL (ref 6–24)
BUN/CREAT SERPL: 18 (ref 9–23)
CALCIUM SERPL-MCNC: 10.1 MG/DL (ref 8.7–10.2)
CHLORIDE SERPL-SCNC: 103 MMOL/L (ref 96–106)
CHOLEST SERPL-MCNC: 256 MG/DL (ref 100–199)
CO2 SERPL-SCNC: 21 MMOL/L (ref 20–29)
CREAT SERPL-MCNC: 0.74 MG/DL (ref 0.57–1)
EGFRCR SERPLBLD CKD-EPI 2021: 93 ML/MIN/1.73
EOSINOPHIL # BLD AUTO: 0.2 X10E3/UL (ref 0–0.4)
EOSINOPHIL NFR BLD AUTO: 2 %
ERYTHROCYTE [DISTWIDTH] IN BLOOD BY AUTOMATED COUNT: 12.6 % (ref 11.7–15.4)
GLOBULIN SER CALC-MCNC: 2.8 G/DL (ref 1.5–4.5)
GLUCOSE SERPL-MCNC: 102 MG/DL (ref 65–99)
HBA1C MFR BLD: 6 % (ref 4.8–5.6)
HCT VFR BLD AUTO: 41 % (ref 34–46.6)
HDLC SERPL-MCNC: 49 MG/DL
HGB BLD-MCNC: 13.8 G/DL (ref 11.1–15.9)
IMM GRANULOCYTES # BLD AUTO: 0 X10E3/UL (ref 0–0.1)
IMM GRANULOCYTES NFR BLD AUTO: 0 %
LDLC SERPL CALC-MCNC: 189 MG/DL (ref 0–99)
LDLC/HDLC SERPL: 3.9 RATIO (ref 0–3.2)
LYMPHOCYTES # BLD AUTO: 2.9 X10E3/UL (ref 0.7–3.1)
LYMPHOCYTES NFR BLD AUTO: 28 %
MCH RBC QN AUTO: 29.5 PG (ref 26.6–33)
MCHC RBC AUTO-ENTMCNC: 33.7 G/DL (ref 31.5–35.7)
MCV RBC AUTO: 88 FL (ref 79–97)
MONOCYTES # BLD AUTO: 0.8 X10E3/UL (ref 0.1–0.9)
MONOCYTES NFR BLD AUTO: 8 %
NEUTROPHILS # BLD AUTO: 6.3 X10E3/UL (ref 1.4–7)
NEUTROPHILS NFR BLD AUTO: 61 %
PLATELET # BLD AUTO: 457 X10E3/UL (ref 150–450)
POTASSIUM SERPL-SCNC: 4.6 MMOL/L (ref 3.5–5.2)
PROT SERPL-MCNC: 7.6 G/DL (ref 6–8.5)
RBC # BLD AUTO: 4.68 X10E6/UL (ref 3.77–5.28)
SODIUM SERPL-SCNC: 140 MMOL/L (ref 134–144)
TRIGL SERPL-MCNC: 100 MG/DL (ref 0–149)
VLDLC SERPL CALC-MCNC: 18 MG/DL (ref 5–40)
WBC # BLD AUTO: 10.3 X10E3/UL (ref 3.4–10.8)

## 2022-05-07 RX ORDER — FOSINOPRIL SODIUM 40 MG/1
40 TABLET ORAL DAILY
Qty: 90 TABLET | Refills: 1 | Status: SHIPPED | OUTPATIENT
Start: 2022-05-07 | End: 2022-10-31

## 2022-06-06 RX ORDER — METOPROLOL SUCCINATE 25 MG/1
25 TABLET, EXTENDED RELEASE ORAL DAILY
Qty: 90 TABLET | Refills: 1 | Status: SHIPPED | OUTPATIENT
Start: 2022-06-06 | End: 2022-12-21

## 2022-08-02 RX ORDER — PANTOPRAZOLE SODIUM 40 MG/1
40 TABLET, DELAYED RELEASE ORAL DAILY
Qty: 30 TABLET | Refills: 3 | Status: SHIPPED | OUTPATIENT
Start: 2022-08-02 | End: 2022-10-31

## 2022-09-01 RX ORDER — GEMFIBROZIL 600 MG/1
TABLET, FILM COATED ORAL
Qty: 180 TABLET | Refills: 1 | Status: SHIPPED | OUTPATIENT
Start: 2022-09-01 | End: 2023-04-03

## 2022-09-06 ENCOUNTER — TELEPHONE (OUTPATIENT)
Dept: FAMILY MEDICINE CLINIC | Facility: CLINIC | Age: 62
End: 2022-09-06

## 2022-09-06 DIAGNOSIS — Z78.0 POSTMENOPAUSAL: ICD-10-CM

## 2022-09-06 DIAGNOSIS — Z12.31 OTHER SCREENING MAMMOGRAM: Primary | ICD-10-CM

## 2022-09-06 NOTE — TELEPHONE ENCOUNTER
Caller: Nimco Mccracken    Relationship: Self    Best call back number: [phone]     What orders are you requesting (i.e. lab or imaging): ROUTINE MAMMOGRAM     In what timeframe would the patient need to come in: AS SOON AS POSSIBLE     Where will you receive your lab/imaging services: [facility]     Additional notes: PATIENT IS REQUESTING A CALL BACK TO DISCUSS OTHER ORDERS NEEDED

## 2022-10-18 ENCOUNTER — OFFICE VISIT (OUTPATIENT)
Dept: FAMILY MEDICINE CLINIC | Facility: CLINIC | Age: 62
End: 2022-10-18

## 2022-10-18 VITALS
TEMPERATURE: 97.1 F | SYSTOLIC BLOOD PRESSURE: 149 MMHG | HEART RATE: 91 BPM | DIASTOLIC BLOOD PRESSURE: 81 MMHG | OXYGEN SATURATION: 99 % | BODY MASS INDEX: 25.34 KG/M2 | HEIGHT: 61 IN | WEIGHT: 134.2 LBS

## 2022-10-18 DIAGNOSIS — R73.09 ELEVATED HEMOGLOBIN A1C: ICD-10-CM

## 2022-10-18 DIAGNOSIS — Z13.1 SCREENING FOR DIABETES MELLITUS: ICD-10-CM

## 2022-10-18 DIAGNOSIS — F17.210 CIGARETTE SMOKER: ICD-10-CM

## 2022-10-18 DIAGNOSIS — F41.9 ANXIETY: ICD-10-CM

## 2022-10-18 DIAGNOSIS — Z00.00 PREVENTATIVE HEALTH CARE: ICD-10-CM

## 2022-10-18 DIAGNOSIS — M54.41 ACUTE RIGHT-SIDED LOW BACK PAIN WITH RIGHT-SIDED SCIATICA: ICD-10-CM

## 2022-10-18 DIAGNOSIS — R73.01 FASTING HYPERGLYCEMIA: ICD-10-CM

## 2022-10-18 DIAGNOSIS — E78.5 DYSLIPIDEMIA: Primary | ICD-10-CM

## 2022-10-18 PROCEDURE — 99213 OFFICE O/P EST LOW 20 MIN: CPT | Performed by: NURSE PRACTITIONER

## 2022-10-18 RX ORDER — HYDROXYZINE PAMOATE 50 MG/1
50 CAPSULE ORAL 3 TIMES DAILY PRN
Qty: 120 CAPSULE | Refills: 2 | Status: SHIPPED | OUTPATIENT
Start: 2022-10-18

## 2022-10-18 NOTE — PROGRESS NOTES
Nimco Mccracken is a 62 y.o. female.     History of Present Illness  62-year-old white female smoker with history of COPD, hypertension, carpal tunnel, GERD, anemia and anxiety who comes in today for 6-month follow-up visit and fasting blood work    Blood pressure 148/80 heart rate 90 she denies any chest pain, dyspnea, tachycardia or dizziness    Patient still having some low back pain and pain radiating into the groins and I showed her some back stretching exercises to do the help with that.  She states this only when she does certain activities she knows she should not do but overall back has improved    Patient states she has noticed she has become more anxious and has had a couple of anxiety attacks.  She states is not all the time but she would just like to have something around when she needs it.  We discussed it and decided to start with Zoloft on a daily basis to see if it helps control overall anxiety better and also ordered her some hydroxyzine to use as needed.  She can also use this for sleep as well    Last blood work showed fasting blood sugar 102 A1c 6.0 and cholesterol 256.  Patient knows to watch her sweets he does have a family history of diabetes.  We will be doing fasting labs today    Weight is up 3 pounds at 134 with a BMI of 25.4.  Patient has had 2 COVID vaccines up-to-date on Pap smear mammogram DEXA scan and colonoscopy is due in 2029.  She still needs to schedule an eye exam            Fasting blood work  Zoloft 50 mg daily  Hydroxyzine 50 mg as needed for anxiety or insomnia  Schedule eye exam  Stop smoking  Follow-up 6 months       The following portions of the patient's history were reviewed and updated as appropriate: allergies, current medications, past family history, past medical history, past social history, past surgical history and problem list.    Vitals:    10/18/22 0804   BP: 149/81   BP Location: Right arm   Patient Position: Sitting   Cuff Size: Adult   Pulse: 91  "  Temp: 97.1 °F (36.2 °C)   TempSrc: Temporal   SpO2: 99%   Weight: 60.9 kg (134 lb 3.2 oz)   Height: 154.9 cm (61\")     Body mass index is 25.36 kg/m².    Past Medical History:   Diagnosis Date   • Acute sinusitis    • Acute upper respiratory infection    • Allergic rhinitis due to pollen    • Anxiety    • Body aches    • Carpal tunnel syndrome    • Cigarette smoker    • Cough    • Dysconjugate gaze    • Dysfunction of eustachian tube    • Dyslipidemia    • Dysmenorrhea    • Fasting hyperglycemia    • GERD (gastroesophageal reflux disease)    • Hand pain, left    • Hemorrhoids    • Hx of abnormal cervical Pap smear    • Hypertension    • Irregular menses    • Leg pain, bilateral    • Nodule of upper lobe of right lung     BENIGN   • Paresthesia of both hands    • Post-menopausal    • RAD (reactive airway disease)    • Skin lesions 06/2007    B9,L BACK,L HIP   • Strabismus    • Trigger finger of right thumb    • Vertigo    • Viral syndrome      Past Surgical History:   Procedure Laterality Date   • CARPAL TUNNEL RELEASE Right 08/2014   • COLONOSCOPY     • ENDOSCOPY  07/21/2006    DR [facility] B9;DR LOZANO     Family History   Problem Relation Age of Onset   • Breast cancer Mother         mid 30's, passed at 38   • Breast cancer Daughter         early 30's, BRCA positive   • Diabetes Other    • Heart disease Other    • Dementia Other    • COPD Other      Immunization History   Administered Date(s) Administered   • COVID-19 (PFIZER) PURPLE CAP 08/26/2021, 09/27/2021   • Pneumococcal Polysaccharide (PPSV23) 02/21/2014, 10/06/2020       Office Visit on 04/14/2022   Component Date Value Ref Range Status   • Hemoglobin A1C 04/14/2022 6.0 (H)  4.8 - 5.6 % Final    Comment:          Prediabetes: 5.7 - 6.4           Diabetes: >6.4           Glycemic control for adults with diabetes: <7.0     • WBC 04/14/2022 10.3  3.4 - 10.8 x10E3/uL Final   • RBC 04/14/2022 4.68  3.77 - 5.28 x10E6/uL Final   • Hemoglobin 04/14/2022 " 13.8  11.1 - 15.9 g/dL Final   • Hematocrit 04/14/2022 41.0  34.0 - 46.6 % Final   • MCV 04/14/2022 88  79 - 97 fL Final   • MCH 04/14/2022 29.5  26.6 - 33.0 pg Final   • MCHC 04/14/2022 33.7  31.5 - 35.7 g/dL Final   • RDW 04/14/2022 12.6  11.7 - 15.4 % Final   • Platelets 04/14/2022 457 (H)  150 - 450 x10E3/uL Final   • Neutrophil Rel % 04/14/2022 61  Not Estab. % Final   • Lymphocyte Rel % 04/14/2022 28  Not Estab. % Final   • Monocyte Rel % 04/14/2022 8  Not Estab. % Final   • Eosinophil Rel % 04/14/2022 2  Not Estab. % Final   • Basophil Rel % 04/14/2022 1  Not Estab. % Final   • Neutrophils Absolute 04/14/2022 6.3  1.4 - 7.0 x10E3/uL Final   • Lymphocytes Absolute 04/14/2022 2.9  0.7 - 3.1 x10E3/uL Final   • Monocytes Absolute 04/14/2022 0.8  0.1 - 0.9 x10E3/uL Final   • Eosinophils Absolute 04/14/2022 0.2  0.0 - 0.4 x10E3/uL Final   • Basophils Absolute 04/14/2022 0.1  0.0 - 0.2 x10E3/uL Final   • Immature Granulocyte Rel % 04/14/2022 0  Not Estab. % Final   • Immature Grans Absolute 04/14/2022 0.0  0.0 - 0.1 x10E3/uL Final   • Glucose 04/14/2022 102 (H)  65 - 99 mg/dL Final   • BUN 04/14/2022 13  6 - 24 mg/dL Final   • Creatinine 04/14/2022 0.74  0.57 - 1.00 mg/dL Final   • EGFR Result 04/14/2022 93  >59 mL/min/1.73 Final   • BUN/Creatinine Ratio 04/14/2022 18  9 - 23 Final   • Sodium 04/14/2022 140  134 - 144 mmol/L Final   • Potassium 04/14/2022 4.6  3.5 - 5.2 mmol/L Final   • Chloride 04/14/2022 103  96 - 106 mmol/L Final   • Total CO2 04/14/2022 21  20 - 29 mmol/L Final   • Calcium 04/14/2022 10.1  8.7 - 10.2 mg/dL Final   • Total Protein 04/14/2022 7.6  6.0 - 8.5 g/dL Final   • Albumin 04/14/2022 4.8  3.8 - 4.9 g/dL Final   • Globulin 04/14/2022 2.8  1.5 - 4.5 g/dL Final   • A/G Ratio 04/14/2022 1.7  1.2 - 2.2 Final   • Total Bilirubin 04/14/2022 0.2  0.0 - 1.2 mg/dL Final   • Alkaline Phosphatase 04/14/2022 107  44 - 121 IU/L Final   • AST (SGOT) 04/14/2022 16  0 - 40 IU/L Final   • ALT (SGPT)  04/14/2022 10  0 - 32 IU/L Final   • Total Cholesterol 04/14/2022 256 (H)  100 - 199 mg/dL Final   • Triglycerides 04/14/2022 100  0 - 149 mg/dL Final   • HDL Cholesterol 04/14/2022 49  >39 mg/dL Final   • VLDL Cholesterol Denis 04/14/2022 18  5 - 40 mg/dL Final   • LDL Chol Calc ([facility]) 04/14/2022 189 (H)  0 - 99 mg/dL Final   • LDL/HDL RATIO 04/14/2022 3.9 (H)  0.0 - 3.2 ratio Final    Comment:                                     LDL/HDL Ratio                                              Men  Women                                1/2 Avg.Risk  1.0    1.5                                    Avg.Risk  3.6    3.2                                 2X Avg.Risk  6.2    5.0                                 3X Avg.Risk  8.0    6.1           Review of Systems   Constitutional: Negative.    HENT: Negative.    Respiratory: Negative.    Cardiovascular: Negative.    Gastrointestinal: Negative.    Genitourinary: Negative.    Musculoskeletal: Negative.    Skin: Negative.    Neurological: Negative.    Psychiatric/Behavioral: Positive for sleep disturbance. The patient is nervous/anxious.        Objective   Physical Exam  Constitutional:       Appearance: Normal appearance.   HENT:      Head: Normocephalic.   Cardiovascular:      Rate and Rhythm: Normal rate and regular rhythm.      Pulses: Normal pulses.      Heart sounds: Normal heart sounds.   Pulmonary:      Effort: Pulmonary effort is normal.      Breath sounds: Normal breath sounds.   Abdominal:      General: Bowel sounds are normal.   Musculoskeletal:         General: Normal range of motion.   Skin:     General: Skin is warm and dry.   Neurological:      General: No focal deficit present.      Mental Status: She is alert and oriented to person, place, and time.   Psychiatric:         Mood and Affect: Mood normal.         Behavior: Behavior normal.         Procedures    Assessment & Plan   Diagnoses and all orders for this visit:    1. Dyslipidemia (Primary)  -     Lipid Panel With  LDL / HDL Ratio    2. Fasting hyperglycemia  -     Comprehensive Metabolic Panel    3. Preventative health care  -     CBC & Differential    4. Elevated hemoglobin A1c  -     Hemoglobin A1c    5. Screening for diabetes mellitus    6. Anxiety    7. Acute right-sided low back pain with right-sided sciatica    8. Cigarette smoker    9. BMI 25.0-25.9,adult    Other orders  -     sertraline (Zoloft) 50 MG tablet; Take 1 tablet by mouth Daily.  Dispense: 30 tablet; Refill: 2  -     hydrOXYzine pamoate (VISTARIL) 50 MG capsule; Take 1 capsule by mouth 3 (Three) Times a Day As Needed for Itching.  Dispense: 120 capsule; Refill: 2          Current Outpatient Medications:   •  B Complex Vitamins (vitamin b complex) capsule capsule, Take 1 capsule by mouth Daily., Disp: , Rfl:   •  cyclobenzaprine (FLEXERIL) 10 MG tablet, Take 1 tablet by mouth 3 (Three) Times a Day As Needed for Muscle Spasms., Disp: 30 tablet, Rfl: 2  •  fosinopril (MONOPRIL) 40 MG tablet, TAKE 1 TABLET BY MOUTH DAILY, Disp: 90 tablet, Rfl: 1  •  gemfibrozil (LOPID) 600 MG tablet, TAKE 1 TABLET BY MOUTH TWICE DAILY, Disp: 180 tablet, Rfl: 1  •  ibuprofen (ADVIL,MOTRIN) 800 MG tablet, Take 1 tablet by mouth Every 8 (Eight) Hours As Needed for Mild Pain ., Disp: 30 tablet, Rfl: 3  •  metoprolol succinate XL (TOPROL-XL) 25 MG 24 hr tablet, TAKE 1 TABLET BY MOUTH DAILY, Disp: 90 tablet, Rfl: 1  •  pantoprazole (PROTONIX) 40 MG EC tablet, TAKE 1 TABLET BY MOUTH DAILY, Disp: 30 tablet, Rfl: 3  •  pseudoephedrine (Sudafed) 30 MG tablet, Take 1 tablet by mouth Every 4 (Four) Hours As Needed for Congestion., Disp: 60 tablet, Rfl: 2  •  vitamin C (ASCORBIC ACID) 250 MG tablet, Take 500 mg by mouth Daily., Disp: , Rfl:   •  vitamin E 400 UNIT capsule, Take 400 Units by mouth Daily., Disp: , Rfl:   •  hydrOXYzine pamoate (VISTARIL) 50 MG capsule, Take 1 capsule by mouth 3 (Three) Times a Day As Needed for Itching., Disp: 120 capsule, Rfl: 2  •  sertraline (Zoloft) 50 MG  tablet, Take 1 tablet by mouth Daily., Disp: 30 tablet, Rfl: 2           Juliet Marcos, BILLY 10/18/2022 09:23 EDT  This note has been electronically signed

## 2022-10-18 NOTE — PATIENT INSTRUCTIONS
Fasting blood work  Zoloft 50 mg daily  Hydroxyzine 50 mg as needed for anxiety or insomnia  Schedule eye exam  Stop smoking  Follow-up 6 months

## 2022-10-19 LAB
ALBUMIN SERPL-MCNC: 4.8 G/DL (ref 3.8–4.9)
ALBUMIN/GLOB SERPL: 1.9 {RATIO} (ref 1.2–2.2)
ALP SERPL-CCNC: 116 IU/L (ref 44–121)
ALT SERPL-CCNC: 11 IU/L (ref 0–32)
AST SERPL-CCNC: 16 IU/L (ref 0–40)
BASOPHILS # BLD AUTO: 0.1 X10E3/UL (ref 0–0.2)
BASOPHILS NFR BLD AUTO: 1 %
BILIRUB SERPL-MCNC: 0.2 MG/DL (ref 0–1.2)
BUN SERPL-MCNC: 13 MG/DL (ref 8–27)
BUN/CREAT SERPL: 17 (ref 12–28)
CALCIUM SERPL-MCNC: 10 MG/DL (ref 8.7–10.3)
CHLORIDE SERPL-SCNC: 105 MMOL/L (ref 96–106)
CHOLEST SERPL-MCNC: 243 MG/DL (ref 100–199)
CO2 SERPL-SCNC: 24 MMOL/L (ref 20–29)
CREAT SERPL-MCNC: 0.76 MG/DL (ref 0.57–1)
EGFRCR SERPLBLD CKD-EPI 2021: 90 ML/MIN/1.73
EOSINOPHIL # BLD AUTO: 0.2 X10E3/UL (ref 0–0.4)
EOSINOPHIL NFR BLD AUTO: 2 %
ERYTHROCYTE [DISTWIDTH] IN BLOOD BY AUTOMATED COUNT: 12.3 % (ref 11.7–15.4)
GLOBULIN SER CALC-MCNC: 2.5 G/DL (ref 1.5–4.5)
GLUCOSE SERPL-MCNC: 97 MG/DL (ref 70–99)
HBA1C MFR BLD: 5.7 % (ref 4.8–5.6)
HCT VFR BLD AUTO: 38.7 % (ref 34–46.6)
HDLC SERPL-MCNC: 52 MG/DL
HGB BLD-MCNC: 13.2 G/DL (ref 11.1–15.9)
IMM GRANULOCYTES # BLD AUTO: 0 X10E3/UL (ref 0–0.1)
IMM GRANULOCYTES NFR BLD AUTO: 0 %
LDLC SERPL CALC-MCNC: 174 MG/DL (ref 0–99)
LDLC/HDLC SERPL: 3.3 RATIO (ref 0–3.2)
LYMPHOCYTES # BLD AUTO: 3.3 X10E3/UL (ref 0.7–3.1)
LYMPHOCYTES NFR BLD AUTO: 34 %
MCH RBC QN AUTO: 29.9 PG (ref 26.6–33)
MCHC RBC AUTO-ENTMCNC: 34.1 G/DL (ref 31.5–35.7)
MCV RBC AUTO: 88 FL (ref 79–97)
MONOCYTES # BLD AUTO: 0.5 X10E3/UL (ref 0.1–0.9)
MONOCYTES NFR BLD AUTO: 6 %
NEUTROPHILS # BLD AUTO: 5.4 X10E3/UL (ref 1.4–7)
NEUTROPHILS NFR BLD AUTO: 57 %
PLATELET # BLD AUTO: 425 X10E3/UL (ref 150–450)
POTASSIUM SERPL-SCNC: 4.3 MMOL/L (ref 3.5–5.2)
PROT SERPL-MCNC: 7.3 G/DL (ref 6–8.5)
RBC # BLD AUTO: 4.42 X10E6/UL (ref 3.77–5.28)
SODIUM SERPL-SCNC: 142 MMOL/L (ref 134–144)
TRIGL SERPL-MCNC: 96 MG/DL (ref 0–149)
VLDLC SERPL CALC-MCNC: 17 MG/DL (ref 5–40)
WBC # BLD AUTO: 9.5 X10E3/UL (ref 3.4–10.8)

## 2022-10-31 RX ORDER — PANTOPRAZOLE SODIUM 40 MG/1
40 TABLET, DELAYED RELEASE ORAL DAILY
Qty: 30 TABLET | Refills: 3 | Status: SHIPPED | OUTPATIENT
Start: 2022-10-31 | End: 2023-03-14

## 2022-10-31 RX ORDER — FOSINOPRIL SODIUM 40 MG/1
40 TABLET ORAL DAILY
Qty: 90 TABLET | Refills: 1 | Status: SHIPPED | OUTPATIENT
Start: 2022-10-31

## 2022-11-11 ENCOUNTER — HOSPITAL ENCOUNTER (OUTPATIENT)
Dept: BONE DENSITY | Facility: HOSPITAL | Age: 62
Discharge: HOME | End: 2022-11-11

## 2022-11-11 ENCOUNTER — HOSPITAL ENCOUNTER (OUTPATIENT)
Dept: MAMMOGRAPHY | Facility: HOSPITAL | Age: 62
Discharge: HOME | End: 2022-11-11

## 2022-11-11 DIAGNOSIS — Z12.31 OTHER SCREENING MAMMOGRAM: ICD-10-CM

## 2022-11-11 DIAGNOSIS — Z78.0 POSTMENOPAUSAL: ICD-10-CM

## 2022-11-11 PROCEDURE — 77067 SCR MAMMO BI INCL CAD: CPT

## 2022-11-11 PROCEDURE — 77063 BREAST TOMOSYNTHESIS BI: CPT

## 2022-11-11 PROCEDURE — 77080 DXA BONE DENSITY AXIAL: CPT

## 2022-12-18 RX ORDER — PSEUDOPHEDRINE HCL 30 MG/1
TABLET, FILM COATED ORAL
Qty: 60 TABLET | Refills: 2 | Status: SHIPPED | OUTPATIENT
Start: 2022-12-18

## 2022-12-21 RX ORDER — METOPROLOL SUCCINATE 25 MG/1
25 TABLET, EXTENDED RELEASE ORAL DAILY
Qty: 90 TABLET | Refills: 1 | Status: SHIPPED | OUTPATIENT
Start: 2022-12-21

## 2023-01-24 ENCOUNTER — TELEPHONE (OUTPATIENT)
Dept: FAMILY MEDICINE CLINIC | Facility: CLINIC | Age: 63
End: 2023-01-24

## 2023-01-24 RX ORDER — GUAIFENESIN AND CODEINE PHOSPHATE 100; 10 MG/5ML; MG/5ML
5 SOLUTION ORAL 4 TIMES DAILY PRN
Qty: 240 ML | Refills: 1 | Status: SHIPPED | OUTPATIENT
Start: 2023-01-24

## 2023-01-24 NOTE — TELEPHONE ENCOUNTER
Caller: Nimco Mccracken    Relationship: Self    Best call back number: [phone]    What medication are you requesting:COVID POSITIVE   1/21/23    What are your current symptoms: CONGESTION AND COUGH   How long have you been experiencing symptoms: 7 DAYS    Have you had these symptoms before:    [x] Yes  [] No    Have you been treated for these symptoms before:   [x] Yes  [] No    If a prescription is needed, what is your preferred pharmacy and phone number: [id number] - [place], [place] - [address] AT [address] [address] [address] [facility] - [phone]  - [phone]      Additional notes:

## 2023-01-30 ENCOUNTER — OFFICE VISIT (OUTPATIENT)
Dept: FAMILY MEDICINE CLINIC | Facility: CLINIC | Age: 63
End: 2023-01-30
Payer: OTHER GOVERNMENT

## 2023-01-30 VITALS
WEIGHT: 134.6 LBS | HEART RATE: 101 BPM | BODY MASS INDEX: 25.41 KG/M2 | HEIGHT: 61 IN | TEMPERATURE: 97.3 F | DIASTOLIC BLOOD PRESSURE: 89 MMHG | OXYGEN SATURATION: 97 % | SYSTOLIC BLOOD PRESSURE: 167 MMHG

## 2023-01-30 DIAGNOSIS — T78.40XD ALLERGY, SUBSEQUENT ENCOUNTER: ICD-10-CM

## 2023-01-30 DIAGNOSIS — J01.00 ACUTE NON-RECURRENT MAXILLARY SINUSITIS: Primary | ICD-10-CM

## 2023-01-30 PROCEDURE — 99213 OFFICE O/P EST LOW 20 MIN: CPT | Performed by: NURSE PRACTITIONER

## 2023-01-30 RX ORDER — AZITHROMYCIN 250 MG/1
TABLET, FILM COATED ORAL
Qty: 6 TABLET | Refills: 0 | Status: SHIPPED | OUTPATIENT
Start: 2023-01-30

## 2023-01-30 NOTE — PROGRESS NOTES
"    Nimco Mccracken is a 62 y.o. female.     History of Present Illness  62-year-old white female smoker with history of COPD, hypertension, carpal tunnel, GERD, anemia and anxiety who comes in today for follow-up visit    Blood pressure 166/88 heart rate 100 she denies any chest pain, dyspnea, tachycardia or dizziness    Patient had COVID last month and now thinks she has a sinus infection.  She is having maxillary sinus pressure and frontal headaches.  Examination reveals acute sinusitis I am going to place her on a Z-Tom she is already on allergy medication at home.  I advised her to wear a mask when going outside              Z-Tom  Home allergy medication as before  Consider Lore pot  Wear mask when going outside  Follow-up if no improvement  Stop smoking       The following portions of the patient's history were reviewed and updated as appropriate: allergies, current medications, past family history, past medical history, past social history, past surgical history and problem list.    Vitals:    01/30/23 1025   BP: 167/89   BP Location: Right arm   Patient Position: Sitting   Cuff Size: Adult   Pulse: 101   Temp: 97.3 °F (36.3 °C)   TempSrc: Temporal   SpO2: 97%   Weight: 61.1 kg (134 lb 9.6 oz)   Height: 154.9 cm (61\")     Body mass index is 25.43 kg/m².    Past Medical History:   Diagnosis Date   • Acute sinusitis    • Acute upper respiratory infection    • Allergic rhinitis due to pollen    • Anxiety    • BMI 24.0-24.9, adult 10/6/2021   • Body aches    • Carpal tunnel syndrome    • Cigarette smoker    • Cough    • Dysconjugate gaze    • Dysfunction of eustachian tube    • Dyslipidemia    • Dysmenorrhea    • Fasting hyperglycemia    • GERD (gastroesophageal reflux disease)    • Hand pain, left    • Hemorrhoids    • Hx of abnormal cervical Pap smear    • Hypertension    • Irregular menses    • Leg pain, bilateral    • Nodule of upper lobe of right lung     BENIGN   • Paresthesia of both hands    • " Post-menopausal    • RAD (reactive airway disease)    • Skin lesions 06/2007    B9,L BACK,L HIP   • Strabismus    • Trigger finger of right thumb    • Vertigo    • Viral syndrome      Past Surgical History:   Procedure Laterality Date   • CARPAL TUNNEL RELEASE Right 08/2014   • COLONOSCOPY     • ENDOSCOPY  07/21/2006    DR [facility] B9;DR LOZANO     Family History   Problem Relation Age of Onset   • Breast cancer Mother         mid 30's, passed at 38   • Breast cancer Daughter         early 30's, BRCA positive   • Diabetes Other    • Heart disease Other    • Dementia Other    • COPD Other      Immunization History   Administered Date(s) Administered   • COVID-19 (PFIZER) PURPLE CAP 08/26/2021, 09/27/2021   • Pneumococcal Polysaccharide (PPSV23) 02/21/2014, 10/06/2020       Office Visit on 10/18/2022   Component Date Value Ref Range Status   • WBC 10/18/2022 9.5  3.4 - 10.8 x10E3/uL Final   • RBC 10/18/2022 4.42  3.77 - 5.28 x10E6/uL Final   • Hemoglobin 10/18/2022 13.2  11.1 - 15.9 g/dL Final   • Hematocrit 10/18/2022 38.7  34.0 - 46.6 % Final   • MCV 10/18/2022 88  79 - 97 fL Final   • MCH 10/18/2022 29.9  26.6 - 33.0 pg Final   • MCHC 10/18/2022 34.1  31.5 - 35.7 g/dL Final   • RDW 10/18/2022 12.3  11.7 - 15.4 % Final   • Platelets 10/18/2022 425  150 - 450 x10E3/uL Final   • Neutrophil Rel % 10/18/2022 57  Not Estab. % Final   • Lymphocyte Rel % 10/18/2022 34  Not Estab. % Final   • Monocyte Rel % 10/18/2022 6  Not Estab. % Final   • Eosinophil Rel % 10/18/2022 2  Not Estab. % Final   • Basophil Rel % 10/18/2022 1  Not Estab. % Final   • Neutrophils Absolute 10/18/2022 5.4  1.4 - 7.0 x10E3/uL Final   • Lymphocytes Absolute 10/18/2022 3.3 (H)  0.7 - 3.1 x10E3/uL Final   • Monocytes Absolute 10/18/2022 0.5  0.1 - 0.9 x10E3/uL Final   • Eosinophils Absolute 10/18/2022 0.2  0.0 - 0.4 x10E3/uL Final   • Basophils Absolute 10/18/2022 0.1  0.0 - 0.2 x10E3/uL Final   • Immature Granulocyte Rel % 10/18/2022 0  Not  Estab. % Final   • Immature Grans Absolute 10/18/2022 0.0  0.0 - 0.1 x10E3/uL Final   • Glucose 10/18/2022 97  70 - 99 mg/dL Final   • BUN 10/18/2022 13  8 - 27 mg/dL Final   • Creatinine 10/18/2022 0.76  0.57 - 1.00 mg/dL Final   • EGFR Result 10/18/2022 90  >59 mL/min/1.73 Final   • BUN/Creatinine Ratio 10/18/2022 17  12 - 28 Final   • Sodium 10/18/2022 142  134 - 144 mmol/L Final   • Potassium 10/18/2022 4.3  3.5 - 5.2 mmol/L Final   • Chloride 10/18/2022 105  96 - 106 mmol/L Final   • Total CO2 10/18/2022 24  20 - 29 mmol/L Final   • Calcium 10/18/2022 10.0  8.7 - 10.3 mg/dL Final   • Total Protein 10/18/2022 7.3  6.0 - 8.5 g/dL Final   • Albumin 10/18/2022 4.8  3.8 - 4.9 g/dL Final   • Globulin 10/18/2022 2.5  1.5 - 4.5 g/dL Final   • A/G Ratio 10/18/2022 1.9  1.2 - 2.2 Final   • Total Bilirubin 10/18/2022 0.2  0.0 - 1.2 mg/dL Final   • Alkaline Phosphatase 10/18/2022 116  44 - 121 IU/L Final   • AST (SGOT) 10/18/2022 16  0 - 40 IU/L Final   • ALT (SGPT) 10/18/2022 11  0 - 32 IU/L Final   • Total Cholesterol 10/18/2022 243 (H)  100 - 199 mg/dL Final   • Triglycerides 10/18/2022 96  0 - 149 mg/dL Final   • HDL Cholesterol 10/18/2022 52  >39 mg/dL Final   • VLDL Cholesterol Denis 10/18/2022 17  5 - 40 mg/dL Final   • LDL Chol Calc ([facility]) 10/18/2022 174 (H)  0 - 99 mg/dL Final   • LDL/HDL RATIO 10/18/2022 3.3 (H)  0.0 - 3.2 ratio Final    Comment:                                     LDL/HDL Ratio                                              Men  Women                                1/2 Avg.Risk  1.0    1.5                                    Avg.Risk  3.6    3.2                                 2X Avg.Risk  6.2    5.0                                 3X Avg.Risk  8.0    6.1     • Hemoglobin A1C 10/18/2022 5.7 (H)  4.8 - 5.6 % Final    Comment:          Prediabetes: 5.7 - 6.4           Diabetes: >6.4           Glycemic control for adults with diabetes: <7.0           Review of Systems   Constitutional: Negative.    HENT:  Positive for congestion and sinus pressure.    Respiratory: Negative.    Cardiovascular: Negative.    Gastrointestinal: Negative.    Genitourinary: Negative.    Musculoskeletal: Negative.    Skin: Negative.    Neurological: Positive for headache.   Psychiatric/Behavioral: Negative.        Objective   Physical Exam  Constitutional:       Appearance: Normal appearance.   HENT:      Head: Normocephalic.      Ears:      Comments: TMs bulging clear fluid     Nose:      Comments: Bilateral red turbinates     Mouth/Throat:      Comments: Heavy postnasal drip  Cardiovascular:      Rate and Rhythm: Normal rate and regular rhythm.      Pulses: Normal pulses.      Heart sounds: Normal heart sounds.   Pulmonary:      Effort: Pulmonary effort is normal.   Abdominal:      General: Bowel sounds are normal.   Musculoskeletal:         General: Normal range of motion.   Skin:     General: Skin is warm and dry.   Neurological:      General: No focal deficit present.      Mental Status: She is alert and oriented to person, place, and time.   Psychiatric:         Mood and Affect: Mood normal.         Behavior: Behavior normal.         Procedures    Assessment & Plan   Diagnoses and all orders for this visit:    1. Acute non-recurrent maxillary sinusitis (Primary)    2. Allergy, subsequent encounter    Other orders  -     azithromycin (Zithromax Z-Otm) 250 MG tablet; Take 2 tablets the first day, then 1 tablet daily for 4 days.  Dispense: 6 tablet; Refill: 0          Current Outpatient Medications:   •  B Complex Vitamins (vitamin b complex) capsule capsule, Take 1 capsule by mouth Daily., Disp: , Rfl:   •  cyclobenzaprine (FLEXERIL) 10 MG tablet, Take 1 tablet by mouth 3 (Three) Times a Day As Needed for Muscle Spasms., Disp: 30 tablet, Rfl: 2  •  fosinopril (MONOPRIL) 40 MG tablet, TAKE 1 TABLET BY MOUTH DAILY, Disp: 90 tablet, Rfl: 1  •  gemfibrozil (LOPID) 600 MG tablet, TAKE 1 TABLET BY MOUTH TWICE DAILY, Disp: 180 tablet, Rfl: 1  •   hydrOXYzine pamoate (VISTARIL) 50 MG capsule, Take 1 capsule by mouth 3 (Three) Times a Day As Needed for Itching., Disp: 120 capsule, Rfl: 2  •  ibuprofen (ADVIL,MOTRIN) 800 MG tablet, Take 1 tablet by mouth Every 8 (Eight) Hours As Needed for Mild Pain ., Disp: 30 tablet, Rfl: 3  •  metoprolol succinate XL (TOPROL-XL) 25 MG 24 hr tablet, TAKE 1 TABLET BY MOUTH DAILY, Disp: 90 tablet, Rfl: 1  •  pantoprazole (PROTONIX) 40 MG EC tablet, TAKE 1 TABLET BY MOUTH DAILY, Disp: 30 tablet, Rfl: 3  •  sertraline (Zoloft) 50 MG tablet, Take 1 tablet by mouth Daily., Disp: 30 tablet, Rfl: 2  •  vitamin C (ASCORBIC ACID) 250 MG tablet, Take 500 mg by mouth Daily., Disp: , Rfl:   •  vitamin E 400 UNIT capsule, Take 400 Units by mouth Daily., Disp: , Rfl:   •  Wal-phed 30 MG tablet, TAKE 1 TABLET BY MOUTH EVERY 4 HOURS AS NEEDED FOR CONGESTION, Disp: 60 tablet, Rfl: 2  •  azithromycin (Zithromax Z-Tom) 250 MG tablet, Take 2 tablets the first day, then 1 tablet daily for 4 days., Disp: 6 tablet, Rfl: 0  •  guaiFENesin-codeine (GUAIFENESIN AC) 100-10 MG/5ML liquid, Take 5 mL by mouth 4 (Four) Times a Day As Needed for Cough. 1-2 tsp every 4-6 hours as needed for cough. Monitor for drowsiness, Disp: 240 mL, Rfl: 1           BILLY Faith 1/30/2023 10:36 EST  This note has been electronically signed

## 2023-01-30 NOTE — PATIENT INSTRUCTIONS
Z-Tom  Home allergy medication as before  Consider Lore pearce  Wear mask when going outside  Follow-up if no improvement  Stop smoking

## 2023-03-14 RX ORDER — PANTOPRAZOLE SODIUM 40 MG/1
40 TABLET, DELAYED RELEASE ORAL DAILY
Qty: 30 TABLET | Refills: 3 | Status: SHIPPED | OUTPATIENT
Start: 2023-03-14

## 2023-04-03 RX ORDER — GEMFIBROZIL 600 MG/1
TABLET, FILM COATED ORAL
Qty: 180 TABLET | Refills: 1 | Status: SHIPPED | OUTPATIENT
Start: 2023-04-03

## 2023-04-11 RX ORDER — FOSINOPRIL SODIUM 40 MG/1
40 TABLET ORAL DAILY
Qty: 90 TABLET | Refills: 1 | Status: SHIPPED | OUTPATIENT
Start: 2023-04-11

## 2023-04-11 RX ORDER — METOPROLOL SUCCINATE 25 MG/1
25 TABLET, EXTENDED RELEASE ORAL DAILY
Qty: 90 TABLET | Refills: 1 | Status: SHIPPED | OUTPATIENT
Start: 2023-04-11

## 2023-04-24 ENCOUNTER — OFFICE VISIT (OUTPATIENT)
Dept: FAMILY MEDICINE CLINIC | Facility: CLINIC | Age: 63
End: 2023-04-24
Payer: OTHER GOVERNMENT

## 2023-04-24 VITALS
OXYGEN SATURATION: 97 % | HEART RATE: 99 BPM | DIASTOLIC BLOOD PRESSURE: 88 MMHG | HEIGHT: 61 IN | BODY MASS INDEX: 24.77 KG/M2 | SYSTOLIC BLOOD PRESSURE: 152 MMHG | WEIGHT: 131.2 LBS | TEMPERATURE: 96.4 F

## 2023-04-24 DIAGNOSIS — M54.50 ACUTE RIGHT-SIDED LOW BACK PAIN WITHOUT SCIATICA: ICD-10-CM

## 2023-04-24 DIAGNOSIS — I10 PRIMARY HYPERTENSION: ICD-10-CM

## 2023-04-24 DIAGNOSIS — F17.210 CIGARETTE SMOKER: ICD-10-CM

## 2023-04-24 DIAGNOSIS — E78.5 DYSLIPIDEMIA: Primary | ICD-10-CM

## 2023-04-24 DIAGNOSIS — Z00.00 PREVENTATIVE HEALTH CARE: ICD-10-CM

## 2023-04-24 DIAGNOSIS — R73.01 FASTING HYPERGLYCEMIA: ICD-10-CM

## 2023-04-24 RX ORDER — CYCLOBENZAPRINE HCL 10 MG
10 TABLET ORAL 3 TIMES DAILY PRN
Qty: 30 TABLET | Refills: 2 | Status: SHIPPED | OUTPATIENT
Start: 2023-04-24

## 2023-04-24 RX ORDER — PREDNISONE 10 MG/1
TABLET ORAL
Qty: 19 TABLET | Refills: 0 | Status: SHIPPED | OUTPATIENT
Start: 2023-04-24 | End: 2023-05-06

## 2023-04-24 NOTE — PROGRESS NOTES
"    Nimco Mccracken is a 62 y.o. female.     History of Present Illness  This is trouble when you order everything consistent with    I am seeing this kayla he never came in after going to the last note 62-year-old white female smoker with history of COPD, hypertension, carpal tunnel, GERD, anemia and anxiety, who comes in today for 6-month follow-up visit    Blood pressure 150/90 heart rate 98 she denies any chest pain, dyspnea, tachycardia or dizziness.  She is going to monitor blood pressure at home if the bottom number remains 85-90 she will let me know    Main complaint is right-sided low back pain without radiculopathy after lifting a heavy box of books.  Placing her on steroids, muscle relaxers and ibuprofen.  I advised her to use warm heat and to avoid any lifting.  Lumbar x-rays from 2022 showed degenerative disc disease L1-L3 with some disc base narrowing    Weight is down 3 pounds at 131 with a BMI of 24.8.  Patient has been vaccinated for COVID up-to-date her eye exam mammogram DEXA scan and next colonoscopy due in 2028            Fasting blood work  Monitor blood pressure with parameters given and call office  Cut back on caffeine  Prednisone 10 mg taper dose  Ibuprofen 800 mg 3 times daily May use with Tylenol  Flexeril 10 mg 1-3 times a day monitor for drowsiness  Warm heat and no lifting to back  Follow-up 6 months  Stop smoking       The following portions of the patient's history were reviewed and updated as appropriate: allergies, current medications, past family history, past medical history, past social history, past surgical history and problem list.    Vitals:    04/24/23 0756 04/24/23 0758   BP: 150/91 152/88   BP Location: Right arm Right arm   Patient Position: Sitting Sitting   Cuff Size: Adult Adult   Pulse: 99    Temp: 96.4 °F (35.8 °C)    TempSrc: Infrared    SpO2: 97%    Weight: 59.5 kg (131 lb 3.2 oz)    Height: 154.9 cm (60.98\")      Body mass index is 24.8 kg/m².    Past Medical " History:   Diagnosis Date   • Acute sinusitis    • Acute upper respiratory infection    • Allergic rhinitis due to pollen    • Anxiety    • BMI 24.0-24.9, adult 10/6/2021   • Body aches    • Carpal tunnel syndrome    • Cigarette smoker    • Cough    • Dysconjugate gaze    • Dysfunction of eustachian tube    • Dyslipidemia    • Dysmenorrhea    • Fasting hyperglycemia    • GERD (gastroesophageal reflux disease)    • Hand pain, left    • Hemorrhoids    • Hx of abnormal cervical Pap smear    • Hypertension    • Irregular menses    • Leg pain, bilateral    • Nodule of upper lobe of right lung     BENIGN   • Paresthesia of both hands    • Post-menopausal    • RAD (reactive airway disease)    • Skin lesions 06/2007    B9,L BACK,L HIP   • Strabismus    • Trigger finger of right thumb    • Vertigo    • Viral syndrome      Past Surgical History:   Procedure Laterality Date   • CARPAL TUNNEL RELEASE Right 08/2014   • COLONOSCOPY     • ENDOSCOPY  07/21/2006    DR [facility] B9;DR LOZANO     Family History   Problem Relation Age of Onset   • Breast cancer Mother         mid 30's, passed at 38   • Breast cancer Daughter         early 30's, BRCA positive   • Diabetes Other    • Heart disease Other    • Dementia Other    • COPD Other      Immunization History   Administered Date(s) Administered   • COVID-19 (PFIZER) PURPLE CAP 08/26/2021, 09/27/2021   • Pneumococcal Polysaccharide (PPSV23) 02/21/2014, 10/06/2020       Office Visit on 10/18/2022   Component Date Value Ref Range Status   • WBC 10/18/2022 9.5  3.4 - 10.8 x10E3/uL Final   • RBC 10/18/2022 4.42  3.77 - 5.28 x10E6/uL Final   • Hemoglobin 10/18/2022 13.2  11.1 - 15.9 g/dL Final   • Hematocrit 10/18/2022 38.7  34.0 - 46.6 % Final   • MCV 10/18/2022 88  79 - 97 fL Final   • MCH 10/18/2022 29.9  26.6 - 33.0 pg Final   • MCHC 10/18/2022 34.1  31.5 - 35.7 g/dL Final   • RDW 10/18/2022 12.3  11.7 - 15.4 % Final   • Platelets 10/18/2022 425  150 - 450 x10E3/uL Final   •  Neutrophil Rel % 10/18/2022 57  Not Estab. % Final   • Lymphocyte Rel % 10/18/2022 34  Not Estab. % Final   • Monocyte Rel % 10/18/2022 6  Not Estab. % Final   • Eosinophil Rel % 10/18/2022 2  Not Estab. % Final   • Basophil Rel % 10/18/2022 1  Not Estab. % Final   • Neutrophils Absolute 10/18/2022 5.4  1.4 - 7.0 x10E3/uL Final   • Lymphocytes Absolute 10/18/2022 3.3 (H)  0.7 - 3.1 x10E3/uL Final   • Monocytes Absolute 10/18/2022 0.5  0.1 - 0.9 x10E3/uL Final   • Eosinophils Absolute 10/18/2022 0.2  0.0 - 0.4 x10E3/uL Final   • Basophils Absolute 10/18/2022 0.1  0.0 - 0.2 x10E3/uL Final   • Immature Granulocyte Rel % 10/18/2022 0  Not Estab. % Final   • Immature Grans Absolute 10/18/2022 0.0  0.0 - 0.1 x10E3/uL Final   • Glucose 10/18/2022 97  70 - 99 mg/dL Final   • BUN 10/18/2022 13  8 - 27 mg/dL Final   • Creatinine 10/18/2022 0.76  0.57 - 1.00 mg/dL Final   • EGFR Result 10/18/2022 90  >59 mL/min/1.73 Final   • BUN/Creatinine Ratio 10/18/2022 17  12 - 28 Final   • Sodium 10/18/2022 142  134 - 144 mmol/L Final   • Potassium 10/18/2022 4.3  3.5 - 5.2 mmol/L Final   • Chloride 10/18/2022 105  96 - 106 mmol/L Final   • Total CO2 10/18/2022 24  20 - 29 mmol/L Final   • Calcium 10/18/2022 10.0  8.7 - 10.3 mg/dL Final   • Total Protein 10/18/2022 7.3  6.0 - 8.5 g/dL Final   • Albumin 10/18/2022 4.8  3.8 - 4.9 g/dL Final   • Globulin 10/18/2022 2.5  1.5 - 4.5 g/dL Final   • A/G Ratio 10/18/2022 1.9  1.2 - 2.2 Final   • Total Bilirubin 10/18/2022 0.2  0.0 - 1.2 mg/dL Final   • Alkaline Phosphatase 10/18/2022 116  44 - 121 IU/L Final   • AST (SGOT) 10/18/2022 16  0 - 40 IU/L Final   • ALT (SGPT) 10/18/2022 11  0 - 32 IU/L Final   • Total Cholesterol 10/18/2022 243 (H)  100 - 199 mg/dL Final   • Triglycerides 10/18/2022 96  0 - 149 mg/dL Final   • HDL Cholesterol 10/18/2022 52  >39 mg/dL Final   • VLDL Cholesterol Denis 10/18/2022 17  5 - 40 mg/dL Final   • LDL Chol Calc ([facility]) 10/18/2022 174 (H)  0 - 99 mg/dL Final   •  LDL/HDL RATIO 10/18/2022 3.3 (H)  0.0 - 3.2 ratio Final    Comment:                                     LDL/HDL Ratio                                              Men  Women                                1/2 Avg.Risk  1.0    1.5                                    Avg.Risk  3.6    3.2                                 2X Avg.Risk  6.2    5.0                                 3X Avg.Risk  8.0    6.1     • Hemoglobin A1C 10/18/2022 5.7 (H)  4.8 - 5.6 % Final    Comment:          Prediabetes: 5.7 - 6.4           Diabetes: >6.4           Glycemic control for adults with diabetes: <7.0           Review of Systems   Constitutional: Negative.    HENT: Negative.    Respiratory: Negative.    Cardiovascular: Negative.    Gastrointestinal: Negative.    Genitourinary: Negative.    Musculoskeletal: Positive for back pain.   Skin: Negative.    Neurological: Negative.    Psychiatric/Behavioral: Negative.        Objective   Physical Exam  HENT:      Head: Normocephalic.   Cardiovascular:      Rate and Rhythm: Normal rate and regular rhythm.      Pulses: Normal pulses.      Heart sounds: Normal heart sounds.   Pulmonary:      Effort: Pulmonary effort is normal.      Breath sounds: Normal breath sounds.   Abdominal:      General: Bowel sounds are normal.   Musculoskeletal:      Comments: Right-sided low back pain without radiculopathy   Skin:     General: Skin is warm and dry.   Neurological:      General: No focal deficit present.      Mental Status: She is alert and oriented to person, place, and time.   Psychiatric:         Mood and Affect: Mood normal.         Behavior: Behavior normal.         Procedures    Assessment & Plan   Diagnoses and all orders for this visit:    1. Dyslipidemia (Primary)  -     Lipid Panel With LDL / HDL Ratio    2. Fasting hyperglycemia  -     Comprehensive Metabolic Panel  -     Hemoglobin A1c    3. Preventative health care  -     CBC & Differential    4. Primary hypertension    5. Cigarette smoker    6.  BMI 24.0-24.9, adult    7. Acute right-sided low back pain without sciatica    Other orders  -     predniSONE (DELTASONE) 10 MG tablet; Take 3 tablets by mouth Daily for 3 days, THEN 2 tablets Daily for 3 days, THEN 1 tablet Daily for 2 days, THEN 0.5 tablets Daily for 4 days.  Dispense: 19 tablet; Refill: 0  -     cyclobenzaprine (FLEXERIL) 10 MG tablet; Take 1 tablet by mouth 3 (Three) Times a Day As Needed for Muscle Spasms.  Dispense: 30 tablet; Refill: 2          Current Outpatient Medications:   •  B Complex Vitamins (vitamin b complex) capsule capsule, Take 1 capsule by mouth Daily., Disp: , Rfl:   •  cyclobenzaprine (FLEXERIL) 10 MG tablet, Take 1 tablet by mouth 3 (Three) Times a Day As Needed for Muscle Spasms., Disp: 30 tablet, Rfl: 2  •  fosinopril (MONOPRIL) 40 MG tablet, TAKE 1 TABLET BY MOUTH DAILY, Disp: 90 tablet, Rfl: 1  •  gemfibrozil (LOPID) 600 MG tablet, TAKE 1 TABLET BY MOUTH TWICE DAILY, Disp: 180 tablet, Rfl: 1  •  hydrOXYzine pamoate (VISTARIL) 50 MG capsule, Take 1 capsule by mouth 3 (Three) Times a Day As Needed for Itching., Disp: 120 capsule, Rfl: 2  •  ibuprofen (ADVIL,MOTRIN) 800 MG tablet, Take 1 tablet by mouth Every 8 (Eight) Hours As Needed for Mild Pain ., Disp: 30 tablet, Rfl: 3  •  metoprolol succinate XL (TOPROL-XL) 25 MG 24 hr tablet, TAKE 1 TABLET BY MOUTH DAILY, Disp: 90 tablet, Rfl: 1  •  pantoprazole (PROTONIX) 40 MG EC tablet, TAKE 1 TABLET BY MOUTH DAILY, Disp: 30 tablet, Rfl: 3  •  vitamin C (ASCORBIC ACID) 250 MG tablet, Take 2 tablets by mouth Daily., Disp: , Rfl:   •  vitamin E 400 UNIT capsule, Take 1 capsule by mouth Daily., Disp: , Rfl:   •  Wal-phed 30 MG tablet, TAKE 1 TABLET BY MOUTH EVERY 4 HOURS AS NEEDED FOR CONGESTION, Disp: 60 tablet, Rfl: 2  •  predniSONE (DELTASONE) 10 MG tablet, Take 3 tablets by mouth Daily for 3 days, THEN 2 tablets Daily for 3 days, THEN 1 tablet Daily for 2 days, THEN 0.5 tablets Daily for 4 days., Disp: 19 tablet, Rfl: 0            Juliet Marcos, APRN 4/24/2023 08:34 EDT  This note has been electronically signed

## 2023-04-24 NOTE — PATIENT INSTRUCTIONS
Fasting blood work  Monitor blood pressure with parameters given and call office  Cut back on caffeine  Prednisone 10 mg taper dose  Ibuprofen 800 mg 3 times daily May use with Tylenol  Flexeril 10 mg 1-3 times a day monitor for drowsiness  Warm heat and no lifting to back  Follow-up 6 months  Stop smoking

## 2023-04-25 LAB
ALBUMIN SERPL-MCNC: 4.8 G/DL (ref 3.8–4.9)
ALBUMIN/GLOB SERPL: 2 {RATIO} (ref 1.2–2.2)
ALP SERPL-CCNC: 132 IU/L (ref 44–121)
ALT SERPL-CCNC: 12 IU/L (ref 0–32)
AST SERPL-CCNC: 20 IU/L (ref 0–40)
BASOPHILS # BLD AUTO: 0.1 X10E3/UL (ref 0–0.2)
BASOPHILS NFR BLD AUTO: 1 %
BILIRUB SERPL-MCNC: 0.2 MG/DL (ref 0–1.2)
BUN SERPL-MCNC: 14 MG/DL (ref 8–27)
BUN/CREAT SERPL: 19 (ref 12–28)
CALCIUM SERPL-MCNC: 10.1 MG/DL (ref 8.7–10.3)
CHLORIDE SERPL-SCNC: 107 MMOL/L (ref 96–106)
CHOLEST SERPL-MCNC: 230 MG/DL (ref 100–199)
CO2 SERPL-SCNC: 25 MMOL/L (ref 20–29)
CREAT SERPL-MCNC: 0.72 MG/DL (ref 0.57–1)
EGFRCR SERPLBLD CKD-EPI 2021: 96 ML/MIN/1.73
EOSINOPHIL # BLD AUTO: 0.2 X10E3/UL (ref 0–0.4)
EOSINOPHIL NFR BLD AUTO: 2 %
ERYTHROCYTE [DISTWIDTH] IN BLOOD BY AUTOMATED COUNT: 12.6 % (ref 11.7–15.4)
GLOBULIN SER CALC-MCNC: 2.4 G/DL (ref 1.5–4.5)
GLUCOSE SERPL-MCNC: 93 MG/DL (ref 70–99)
HBA1C MFR BLD: 6.1 % (ref 4.8–5.6)
HCT VFR BLD AUTO: 40.7 % (ref 34–46.6)
HDLC SERPL-MCNC: 55 MG/DL
HGB BLD-MCNC: 13.5 G/DL (ref 11.1–15.9)
IMM GRANULOCYTES # BLD AUTO: 0 X10E3/UL (ref 0–0.1)
IMM GRANULOCYTES NFR BLD AUTO: 0 %
LDLC SERPL CALC-MCNC: 163 MG/DL (ref 0–99)
LDLC/HDLC SERPL: 3 RATIO (ref 0–3.2)
LYMPHOCYTES # BLD AUTO: 2.1 X10E3/UL (ref 0.7–3.1)
LYMPHOCYTES NFR BLD AUTO: 24 %
MCH RBC QN AUTO: 29 PG (ref 26.6–33)
MCHC RBC AUTO-ENTMCNC: 33.2 G/DL (ref 31.5–35.7)
MCV RBC AUTO: 88 FL (ref 79–97)
MONOCYTES # BLD AUTO: 0.9 X10E3/UL (ref 0.1–0.9)
MONOCYTES NFR BLD AUTO: 11 %
NEUTROPHILS # BLD AUTO: 5.4 X10E3/UL (ref 1.4–7)
NEUTROPHILS NFR BLD AUTO: 62 %
PLATELET # BLD AUTO: 380 X10E3/UL (ref 150–450)
POTASSIUM SERPL-SCNC: 4.9 MMOL/L (ref 3.5–5.2)
PROT SERPL-MCNC: 7.2 G/DL (ref 6–8.5)
RBC # BLD AUTO: 4.65 X10E6/UL (ref 3.77–5.28)
SODIUM SERPL-SCNC: 146 MMOL/L (ref 134–144)
TRIGL SERPL-MCNC: 72 MG/DL (ref 0–149)
VLDLC SERPL CALC-MCNC: 12 MG/DL (ref 5–40)
WBC # BLD AUTO: 8.6 X10E3/UL (ref 3.4–10.8)

## 2023-05-02 ENCOUNTER — TELEPHONE (OUTPATIENT)
Dept: FAMILY MEDICINE CLINIC | Facility: CLINIC | Age: 63
End: 2023-05-02

## 2023-05-02 RX ORDER — AZITHROMYCIN 250 MG/1
TABLET, FILM COATED ORAL
Qty: 6 TABLET | Refills: 0 | Status: SHIPPED | OUTPATIENT
Start: 2023-05-02

## 2023-05-02 NOTE — TELEPHONE ENCOUNTER
Spoke with pt and she said last week it started as allergies but this week has turned think green/yellow.  She has a lot of sinus pressure.  She is using a neddy pot. And using nose spray.  She will add sudafed.  SG

## 2023-05-02 NOTE — TELEPHONE ENCOUNTER
Caller: Nimco Mccracken    Relationship: Self    Best call back number: [phone]    What medication are you requesting: Z-PACK     What are your current symptoms: SINUS INFECTION- SINUS HEADACHE AND PHLEGM     How long have you been experiencing symptoms: 4/28/23     Have you had these symptoms before:    [x] Yes  [] No    Have you been treated for these symptoms before:   [x] Yes  [] No    If a prescription is needed, what is your preferred pharmacy and phone number: [id number] - [place], [place] - [address] AT [address] & [address] - [phone]  - [phone]      Additional notes:

## 2023-08-31 ENCOUNTER — TELEPHONE (OUTPATIENT)
Dept: FAMILY MEDICINE CLINIC | Facility: CLINIC | Age: 63
End: 2023-08-31
Payer: OTHER GOVERNMENT

## 2023-08-31 ENCOUNTER — TRANSCRIBE ORDERS (OUTPATIENT)
Dept: ADMINISTRATIVE | Facility: HOSPITAL | Age: 63
End: 2023-08-31
Payer: OTHER GOVERNMENT

## 2023-08-31 DIAGNOSIS — Z12.31 ENCOUNTER FOR SCREENING MAMMOGRAM FOR MALIGNANT NEOPLASM OF BREAST: Primary | ICD-10-CM

## 2023-08-31 NOTE — TELEPHONE ENCOUNTER
Caller: Nimco Mccracken    Relationship: Self    Best call back number: [phone]    What orders are you requesting (i.e. lab or imaging): MAMMOGRAM    In what timeframe would the patient need to come in: ASAP    Where will you receive your lab/imaging services: [facility]    Additional notes:     PLEASE CALL TO CONFIRM WITH PATIENT SO SHE CAN SCHEDULE.

## 2023-09-20 RX ORDER — FOSINOPRIL SODIUM 40 MG/1
40 TABLET ORAL DAILY
Qty: 90 TABLET | Refills: 1 | Status: SHIPPED | OUTPATIENT
Start: 2023-09-20

## 2023-09-20 RX ORDER — PANTOPRAZOLE SODIUM 40 MG/1
40 TABLET, DELAYED RELEASE ORAL DAILY
Qty: 90 TABLET | Refills: 0 | Status: SHIPPED | OUTPATIENT
Start: 2023-09-20

## 2023-09-20 RX ORDER — GEMFIBROZIL 600 MG/1
TABLET, FILM COATED ORAL
Qty: 180 TABLET | Refills: 1 | Status: SHIPPED | OUTPATIENT
Start: 2023-09-20

## 2023-09-20 RX ORDER — METOPROLOL SUCCINATE 25 MG/1
25 TABLET, EXTENDED RELEASE ORAL DAILY
Qty: 90 TABLET | Refills: 1 | Status: SHIPPED | OUTPATIENT
Start: 2023-09-20

## 2023-09-28 ENCOUNTER — TELEPHONE (OUTPATIENT)
Dept: FAMILY MEDICINE CLINIC | Facility: CLINIC | Age: 63
End: 2023-09-28
Payer: OTHER GOVERNMENT

## 2023-09-28 NOTE — TELEPHONE ENCOUNTER
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 I advised her that she could go to Urgent Care and she  said that is what she would do.

## 2023-09-28 NOTE — TELEPHONE ENCOUNTER
Caller: Nimco Mccracken    Relationship: Self    Best call back number: [phone]     What medication are you requesting: ANTIBIOTIC    What are your current symptoms: NASAL CONGESTION, DRAINAGE, COUGH    How long have you been experiencing symptoms: ABOUT 2 WEEKS    Have you had these symptoms before:    [x] Yes  [] No    Have you been treated for these symptoms before:   [x] Yes  [] No    If a prescription is needed, what is your preferred pharmacy and phone number: [id number] - [place], [place] - [address] AT [address] & [address] - [phone]  - [phone]     Additional notes: PATIENT STATES SHE IS GOING TO [place] FOR VACATION THIS WEEKEND AND WOULD LIKE TO GET IT CLEARED UP SO SHE IS NOT SICK ON VACATION.    PLEASE ADVISE

## 2023-10-23 ENCOUNTER — OFFICE VISIT (OUTPATIENT)
Dept: FAMILY MEDICINE CLINIC | Facility: CLINIC | Age: 63
End: 2023-10-23
Payer: OTHER GOVERNMENT

## 2023-10-23 VITALS
SYSTOLIC BLOOD PRESSURE: 122 MMHG | HEIGHT: 61 IN | OXYGEN SATURATION: 98 % | BODY MASS INDEX: 24.39 KG/M2 | TEMPERATURE: 97.3 F | HEART RATE: 107 BPM | WEIGHT: 129.2 LBS | DIASTOLIC BLOOD PRESSURE: 68 MMHG

## 2023-10-23 DIAGNOSIS — R73.01 FASTING HYPERGLYCEMIA: ICD-10-CM

## 2023-10-23 DIAGNOSIS — E78.5 DYSLIPIDEMIA: Primary | ICD-10-CM

## 2023-10-23 DIAGNOSIS — Z00.00 PREVENTATIVE HEALTH CARE: ICD-10-CM

## 2023-10-23 DIAGNOSIS — J30.1 ALLERGIC RHINITIS DUE TO POLLEN, UNSPECIFIED SEASONALITY: ICD-10-CM

## 2023-10-23 DIAGNOSIS — J68.3 REACTIVE AIRWAYS DYSFUNCTION SYNDROME: ICD-10-CM

## 2023-10-23 DIAGNOSIS — F17.210 CIGARETTE SMOKER: ICD-10-CM

## 2023-10-23 DIAGNOSIS — Z13.1 SCREENING FOR DIABETES MELLITUS: ICD-10-CM

## 2023-10-23 RX ORDER — ALBUTEROL SULFATE 90 UG/1
2 AEROSOL, METERED RESPIRATORY (INHALATION) EVERY 4 HOURS PRN
Qty: 18 G | Refills: 6 | Status: SHIPPED | OUTPATIENT
Start: 2023-10-23

## 2023-10-23 NOTE — PATIENT INSTRUCTIONS
Fasting blood work  Ventolin inhaler 2 puffs every 4 to 6 hours as needed  Combivent nebulizer 1-4 times a day as needed  Follow-up 6 months

## 2023-10-23 NOTE — PROGRESS NOTES
"    Nimco Mccracken is a 63 y.o. female.     History of Present Illness  63-year-old white female smoker with history of COPD, hypertension, carpal tunnel, GERD, anemia, anxiety and lumbar disc disease who comes in today for 6-month follow-up visit fasting blood work    Blood pressure 122/68 heart rate 102 she denies any chest pain, dyspnea, tachycardia or dizziness    Patient is requesting Ventolin inhaler and nebulizer for episodes of URIs and from smoking.  We will order this for her    Patient has no new complaints is doing quite well.  Weight is stable at 129 for BMI of 24.4.  She has had 2 COVID vaccines up-to-date on eye exam mammogram and her DEXA scan is due November 2024.  She is up-to-date on Pap smears and her next colonoscopy is due in 2028.   patient is wanting to do shingles vaccine and tetanus and I told her to call insurance company to see if they would cover either 1 of those for her    Patient's last A1c was mildly elevated at 6.1 with a normal blood sugar and she knows to watch her sweets and carbs            Fasting blood work  Ventolin inhaler 2 puffs every 4 to 6 hours as needed  Combivent nebulizer 1-4 times a day as needed  Follow-up 6 months       The following portions of the patient's history were reviewed and updated as appropriate: allergies, current medications, past family history, past medical history, past social history, past surgical history, and problem list.    Vitals:    10/23/23 0801   BP: 122/68   BP Location: Right arm   Patient Position: Sitting   Cuff Size: Adult   Pulse: 107   Temp: 97.3 °F (36.3 °C)   TempSrc: Infrared   SpO2: 98%   Weight: 58.6 kg (129 lb 3.2 oz)   Height: 154.9 cm (60.98\")     Body mass index is 24.42 kg/m².    Past Medical History:   Diagnosis Date    Acute sinusitis     Acute upper respiratory infection     Allergic rhinitis due to pollen     Anxiety     BMI 24.0-24.9, adult 10/6/2021    Body aches     Carpal tunnel syndrome     Cigarette smoker  "    Cough     Dysconjugate gaze     Dysfunction of eustachian tube     Dyslipidemia     Dysmenorrhea     Fasting hyperglycemia     GERD (gastroesophageal reflux disease)     Hand pain, left     Hemorrhoids     Hx of abnormal cervical Pap smear     Hypertension     Irregular menses     Leg pain, bilateral     Nodule of upper lobe of right lung     BENIGN    Paresthesia of both hands     Post-menopausal     RAD (reactive airway disease)     Skin lesions 06/2007    B9,L BACK,L HIP    Strabismus     Trigger finger of right thumb     Vertigo     Viral syndrome      Past Surgical History:   Procedure Laterality Date    CARPAL TUNNEL RELEASE Right 08/2014    COLONOSCOPY      ENDOSCOPY  07/21/2006    [facility] B9;DR LOZANO     Family History   Problem Relation Age of Onset    Breast cancer Mother         mid 30's, passed at 38    Breast cancer Daughter         early 30's, BRCA positive    Diabetes Other     Heart disease Other     Dementia Other     COPD Other      Immunization History   Administered Date(s) Administered    COVID-19 (PFIZER) Purple Cap Monovalent 08/26/2021, 09/27/2021    Pneumococcal Polysaccharide (PPSV23) 02/21/2014, 10/06/2020       Office Visit on 04/24/2023   Component Date Value Ref Range Status    WBC 04/24/2023 8.6  3.4 - 10.8 x10E3/uL Final    RBC 04/24/2023 4.65  3.77 - 5.28 x10E6/uL Final    Hemoglobin 04/24/2023 13.5  11.1 - 15.9 g/dL Final    Hematocrit 04/24/2023 40.7  34.0 - 46.6 % Final    MCV 04/24/2023 88  79 - 97 fL Final    MCH 04/24/2023 29.0  26.6 - 33.0 pg Final    MCHC 04/24/2023 33.2  31.5 - 35.7 g/dL Final    RDW 04/24/2023 12.6  11.7 - 15.4 % Final    Platelets 04/24/2023 380  150 - 450 x10E3/uL Final    Neutrophil Rel % 04/24/2023 62  Not Estab. % Final    Lymphocyte Rel % 04/24/2023 24  Not Estab. % Final    Monocyte Rel % 04/24/2023 11  Not Estab. % Final    Eosinophil Rel % 04/24/2023 2  Not Estab. % Final    Basophil Rel % 04/24/2023 1  Not Estab. % Final     Neutrophils Absolute 04/24/2023 5.4  1.4 - 7.0 x10E3/uL Final    Lymphocytes Absolute 04/24/2023 2.1  0.7 - 3.1 x10E3/uL Final    Monocytes Absolute 04/24/2023 0.9  0.1 - 0.9 x10E3/uL Final    Eosinophils Absolute 04/24/2023 0.2  0.0 - 0.4 x10E3/uL Final    Basophils Absolute 04/24/2023 0.1  0.0 - 0.2 x10E3/uL Final    Immature Granulocyte Rel % 04/24/2023 0  Not Estab. % Final    Immature Grans Absolute 04/24/2023 0.0  0.0 - 0.1 x10E3/uL Final    Glucose 04/24/2023 93  70 - 99 mg/dL Final    BUN 04/24/2023 14  8 - 27 mg/dL Final    Creatinine 04/24/2023 0.72  0.57 - 1.00 mg/dL Final    EGFR Result 04/24/2023 96  >59 mL/min/1.73 Final    BUN/Creatinine Ratio 04/24/2023 19  12 - 28 Final    Sodium 04/24/2023 146 (H)  134 - 144 mmol/L Final    Potassium 04/24/2023 4.9  3.5 - 5.2 mmol/L Final    Chloride 04/24/2023 107 (H)  96 - 106 mmol/L Final    Total CO2 04/24/2023 25  20 - 29 mmol/L Final    Calcium 04/24/2023 10.1  8.7 - 10.3 mg/dL Final    Total Protein 04/24/2023 7.2  6.0 - 8.5 g/dL Final    Albumin 04/24/2023 4.8  3.8 - 4.9 g/dL Final    Globulin 04/24/2023 2.4  1.5 - 4.5 g/dL Final    A/G Ratio 04/24/2023 2.0  1.2 - 2.2 Final    Total Bilirubin 04/24/2023 0.2  0.0 - 1.2 mg/dL Final    Alkaline Phosphatase 04/24/2023 132 (H)  44 - 121 IU/L Final    AST (SGOT) 04/24/2023 20  0 - 40 IU/L Final    ALT (SGPT) 04/24/2023 12  0 - 32 IU/L Final    Total Cholesterol 04/24/2023 230 (H)  100 - 199 mg/dL Final    Triglycerides 04/24/2023 72  0 - 149 mg/dL Final    HDL Cholesterol 04/24/2023 55  >39 mg/dL Final    VLDL Cholesterol Denis 04/24/2023 12  5 - 40 mg/dL Final    LDL Chol Calc (NIH) 04/24/2023 163 (H)  0 - 99 mg/dL Final    LDL/HDL RATIO 04/24/2023 3.0  0.0 - 3.2 ratio Final    Comment:                                     LDL/HDL Ratio                                              Men  Women                                1/2 Avg.Risk  1.0    1.5                                    Avg.Risk  3.6    3.2                                  2X Avg.Risk  6.2    5.0                                 3X Avg.Risk  8.0    6.1      Hemoglobin A1C 04/24/2023 6.1 (H)  4.8 - 5.6 % Final    Comment:          Prediabetes: 5.7 - 6.4           Diabetes: >6.4           Glycemic control for adults with diabetes: <7.0           Review of Systems   Constitutional: Negative.    HENT: Negative.     Respiratory:  Positive for shortness of breath.    Cardiovascular: Negative.    Gastrointestinal: Negative.    Genitourinary: Negative.    Musculoskeletal: Negative.    Skin: Negative.    Neurological: Negative.    Psychiatric/Behavioral: Negative.         Objective   Physical Exam  Constitutional:       Appearance: Normal appearance.   HENT:      Head: Normocephalic.   Cardiovascular:      Rate and Rhythm: Normal rate and regular rhythm.      Pulses: Normal pulses.      Heart sounds: Normal heart sounds.   Pulmonary:      Effort: Pulmonary effort is normal.      Breath sounds: Normal breath sounds.   Abdominal:      General: Bowel sounds are normal.   Musculoskeletal:         General: Normal range of motion.   Skin:     General: Skin is warm.   Neurological:      General: No focal deficit present.      Mental Status: She is alert and oriented to person, place, and time.   Psychiatric:         Mood and Affect: Mood normal.         Behavior: Behavior normal.         Procedures    Assessment & Plan   Diagnoses and all orders for this visit:    1. Dyslipidemia (Primary)  -     Lipid Panel With LDL / HDL Ratio    2. Fasting hyperglycemia  -     Comprehensive Metabolic Panel  -     Hemoglobin A1c    3. Preventative health care  -     CBC & Differential    4. Allergic rhinitis due to pollen, unspecified seasonality    5. BMI 24.0-24.9, adult    6. Screening for diabetes mellitus    7. Reactive airways dysfunction syndrome    8. Cigarette smoker    Other orders  -     albuterol sulfate HFA (Ventolin HFA) 108 (90 Base) MCG/ACT inhaler; Inhale 2 puffs Every 4 (Four)  Hours As Needed for Wheezing.  Dispense: 18 g; Refill: 6  -     ipratropium (ATROVENT) 0.02 % nebulizer solution; Take 2.5 mL by nebulization 4 (Four) Times a Day.  Dispense: 360 mL; Refill: 12           Current Outpatient Medications:     B Complex Vitamins (vitamin b complex) capsule capsule, Take 1 capsule by mouth Daily., Disp: , Rfl:     cyclobenzaprine (FLEXERIL) 10 MG tablet, Take 1 tablet by mouth 3 (Three) Times a Day As Needed for Muscle Spasms., Disp: 30 tablet, Rfl: 2    fosinopril (MONOPRIL) 40 MG tablet, TAKE 1 TABLET BY MOUTH DAILY, Disp: 90 tablet, Rfl: 1    gemfibrozil (LOPID) 600 MG tablet, TAKE 1 TABLET BY MOUTH TWICE DAILY, Disp: 180 tablet, Rfl: 1    hydrOXYzine pamoate (VISTARIL) 50 MG capsule, Take 1 capsule by mouth 3 (Three) Times a Day As Needed for Itching., Disp: 120 capsule, Rfl: 2    ibuprofen (ADVIL,MOTRIN) 800 MG tablet, TAKE 1 TABLET BY MOUTH EVERY 8 HOURS AS NEEDED FOR MILD PAIN, Disp: 30 tablet, Rfl: 3    metoprolol succinate XL (TOPROL-XL) 25 MG 24 hr tablet, TAKE 1 TABLET BY MOUTH DAILY, Disp: 90 tablet, Rfl: 1    pantoprazole (PROTONIX) 40 MG EC tablet, TAKE 1 TABLET BY MOUTH DAILY, Disp: 90 tablet, Rfl: 0    vitamin C (ASCORBIC ACID) 250 MG tablet, Take 2 tablets by mouth Daily., Disp: , Rfl:     vitamin E 400 UNIT capsule, Take 1 capsule by mouth Daily., Disp: , Rfl:     Wal-phed 30 MG tablet, TAKE 1 TABLET BY MOUTH EVERY 4 HOURS AS NEEDED FOR CONGESTION, Disp: 60 tablet, Rfl: 2    albuterol sulfate HFA (Ventolin HFA) 108 (90 Base) MCG/ACT inhaler, Inhale 2 puffs Every 4 (Four) Hours As Needed for Wheezing., Disp: 18 g, Rfl: 6    ipratropium (ATROVENT) 0.02 % nebulizer solution, Take 2.5 mL by nebulization 4 (Four) Times a Day., Disp: 360 mL, Rfl: 12           BILLY Faith 10/23/2023 08:43 EDT  This note has been electronically signed

## 2023-10-24 LAB
ALBUMIN SERPL-MCNC: 4.7 G/DL (ref 3.9–4.9)
ALBUMIN/GLOB SERPL: 1.7 {RATIO} (ref 1.2–2.2)
ALP SERPL-CCNC: 120 IU/L (ref 44–121)
ALT SERPL-CCNC: 12 IU/L (ref 0–32)
AST SERPL-CCNC: 19 IU/L (ref 0–40)
BASOPHILS # BLD AUTO: 0.1 X10E3/UL (ref 0–0.2)
BASOPHILS NFR BLD AUTO: 1 %
BILIRUB SERPL-MCNC: 0.2 MG/DL (ref 0–1.2)
BUN SERPL-MCNC: 15 MG/DL (ref 8–27)
BUN/CREAT SERPL: 20 (ref 12–28)
CALCIUM SERPL-MCNC: 10.1 MG/DL (ref 8.7–10.3)
CHLORIDE SERPL-SCNC: 106 MMOL/L (ref 96–106)
CHOLEST SERPL-MCNC: 238 MG/DL (ref 100–199)
CO2 SERPL-SCNC: 23 MMOL/L (ref 20–29)
CREAT SERPL-MCNC: 0.75 MG/DL (ref 0.57–1)
EGFRCR SERPLBLD CKD-EPI 2021: 91 ML/MIN/1.73
EOSINOPHIL # BLD AUTO: 0.2 X10E3/UL (ref 0–0.4)
EOSINOPHIL NFR BLD AUTO: 2 %
ERYTHROCYTE [DISTWIDTH] IN BLOOD BY AUTOMATED COUNT: 12.5 % (ref 11.7–15.4)
GLOBULIN SER CALC-MCNC: 2.8 G/DL (ref 1.5–4.5)
GLUCOSE SERPL-MCNC: 103 MG/DL (ref 70–99)
HBA1C MFR BLD: 5.8 % (ref 4.8–5.6)
HCT VFR BLD AUTO: 43.4 % (ref 34–46.6)
HDLC SERPL-MCNC: 55 MG/DL
HGB BLD-MCNC: 14.3 G/DL (ref 11.1–15.9)
IMM GRANULOCYTES # BLD AUTO: 0 X10E3/UL (ref 0–0.1)
IMM GRANULOCYTES NFR BLD AUTO: 0 %
LDLC SERPL CALC-MCNC: 170 MG/DL (ref 0–99)
LDLC/HDLC SERPL: 3.1 RATIO (ref 0–3.2)
LYMPHOCYTES # BLD AUTO: 3 X10E3/UL (ref 0.7–3.1)
LYMPHOCYTES NFR BLD AUTO: 27 %
MCH RBC QN AUTO: 29.7 PG (ref 26.6–33)
MCHC RBC AUTO-ENTMCNC: 32.9 G/DL (ref 31.5–35.7)
MCV RBC AUTO: 90 FL (ref 79–97)
MONOCYTES # BLD AUTO: 0.6 X10E3/UL (ref 0.1–0.9)
MONOCYTES NFR BLD AUTO: 5 %
NEUTROPHILS # BLD AUTO: 7.5 X10E3/UL (ref 1.4–7)
NEUTROPHILS NFR BLD AUTO: 65 %
PLATELET # BLD AUTO: 374 X10E3/UL (ref 150–450)
POTASSIUM SERPL-SCNC: 5.1 MMOL/L (ref 3.5–5.2)
PROT SERPL-MCNC: 7.5 G/DL (ref 6–8.5)
RBC # BLD AUTO: 4.81 X10E6/UL (ref 3.77–5.28)
SODIUM SERPL-SCNC: 145 MMOL/L (ref 134–144)
TRIGL SERPL-MCNC: 78 MG/DL (ref 0–149)
VLDLC SERPL CALC-MCNC: 13 MG/DL (ref 5–40)
WBC # BLD AUTO: 11.4 X10E3/UL (ref 3.4–10.8)

## 2023-11-13 ENCOUNTER — HOSPITAL ENCOUNTER (OUTPATIENT)
Dept: MAMMOGRAPHY | Facility: HOSPITAL | Age: 63
Discharge: HOME | End: 2023-11-13
Admitting: NURSE PRACTITIONER
Payer: OTHER GOVERNMENT

## 2023-11-13 DIAGNOSIS — Z12.31 ENCOUNTER FOR SCREENING MAMMOGRAM FOR MALIGNANT NEOPLASM OF BREAST: ICD-10-CM

## 2023-11-13 PROCEDURE — 77067 SCR MAMMO BI INCL CAD: CPT

## 2023-11-13 PROCEDURE — 77063 BREAST TOMOSYNTHESIS BI: CPT

## 2023-12-18 RX ORDER — PANTOPRAZOLE SODIUM 40 MG/1
40 TABLET, DELAYED RELEASE ORAL DAILY
Qty: 90 TABLET | Refills: 0 | Status: SHIPPED | OUTPATIENT
Start: 2023-12-18

## 2024-03-18 ENCOUNTER — PATIENT ROUNDING (BHMG ONLY) (OUTPATIENT)
Dept: URGENT CARE | Facility: CLINIC | Age: 64
End: 2024-03-18
Payer: OTHER GOVERNMENT

## 2024-03-18 NOTE — ED NOTES
Thank you for letting us care for you in your recent visit to our urgent care center. We would love to hear about your experience with us. Was this the first time you have visited our location?    We’re always looking for ways to make our patients’ experiences even better. Do you have any recommendations on ways we may improve?     I appreciate you taking the time to respond. Please be on the lookout for a survey about your recent visit from [employer] via text or email. We would greatly appreciate if you could fill that out and turn it back in. We want your voice to be heard and we value your feedback.   Thank you for choosing [facility] for your healthcare needs.

## 2024-03-20 RX ORDER — GEMFIBROZIL 600 MG/1
TABLET, FILM COATED ORAL
Qty: 180 TABLET | Refills: 1 | Status: SHIPPED | OUTPATIENT
Start: 2024-03-20

## 2024-03-20 RX ORDER — METOPROLOL SUCCINATE 25 MG/1
25 TABLET, EXTENDED RELEASE ORAL DAILY
Qty: 90 TABLET | Refills: 1 | Status: SHIPPED | OUTPATIENT
Start: 2024-03-20

## 2024-03-20 RX ORDER — FOSINOPRIL SODIUM 40 MG/1
40 TABLET ORAL DAILY
Qty: 90 TABLET | Refills: 1 | Status: SHIPPED | OUTPATIENT
Start: 2024-03-20

## 2024-03-25 RX ORDER — PANTOPRAZOLE SODIUM 40 MG/1
40 TABLET, DELAYED RELEASE ORAL DAILY
Qty: 90 TABLET | Refills: 0 | Status: SHIPPED | OUTPATIENT
Start: 2024-03-25

## 2024-04-29 ENCOUNTER — OFFICE VISIT (OUTPATIENT)
Dept: FAMILY MEDICINE CLINIC | Facility: CLINIC | Age: 64
End: 2024-04-29
Payer: OTHER GOVERNMENT

## 2024-04-29 VITALS
WEIGHT: 125 LBS | SYSTOLIC BLOOD PRESSURE: 108 MMHG | OXYGEN SATURATION: 92 % | HEART RATE: 65 BPM | DIASTOLIC BLOOD PRESSURE: 68 MMHG | BODY MASS INDEX: 24.54 KG/M2 | HEIGHT: 60 IN | TEMPERATURE: 97.3 F

## 2024-04-29 DIAGNOSIS — Z00.00 PREVENTATIVE HEALTH CARE: ICD-10-CM

## 2024-04-29 DIAGNOSIS — F17.210 CIGARETTE SMOKER: ICD-10-CM

## 2024-04-29 DIAGNOSIS — R73.01 FASTING HYPERGLYCEMIA: ICD-10-CM

## 2024-04-29 DIAGNOSIS — E78.5 DYSLIPIDEMIA: Primary | ICD-10-CM

## 2024-04-29 PROCEDURE — 99213 OFFICE O/P EST LOW 20 MIN: CPT | Performed by: NURSE PRACTITIONER

## 2024-04-29 NOTE — PROGRESS NOTES
"Nimco Mccracken is a 63 y.o. female.     History of Present Illness  63-year-old white female smoker with history of COPD, hypertension, carpal tunnel, GERD, anemia, anxiety and lumbar disc disease who comes in today for 6-month follow-up visit fasting blood work    Blood pressure 108/68 heart rate 64 she denies any chest pain, dyspnea, tachycardia or dizziness    Patient has family history diabetes and is diet controlled diabetic with minimal increase in A1c of 5.8 fasting blood sugar 103.  She has lost 4 pounds with a weight of 125 and a BMI 24.4    Patient had 2 COVID vaccines up-to-date on eye exam.  Her mammogram and DEXA scan are due in November 2024 and her next colonoscopy is due in 2028            Fasting blood work  Follow-up 6 months  Stop smoking       The following portions of the patient's history were reviewed and updated as appropriate: allergies, current medications, past family history, past medical history, past social history, past surgical history, and problem list.    Vitals:    04/29/24 0806   BP: 108/68   BP Location: Right arm   Patient Position: Sitting   Cuff Size: Adult   Pulse: 65   Temp: 97.3 °F (36.3 °C)   TempSrc: Infrared   SpO2: 92%   Weight: 56.7 kg (125 lb)   Height: 152.4 cm (60\")     Body mass index is 24.41 kg/m².    Past Medical History:   Diagnosis Date    Acute sinusitis     Acute upper respiratory infection     Allergic rhinitis due to pollen     Anxiety     BMI 24.0-24.9, adult 10/6/2021    Body aches     Carpal tunnel syndrome     Cigarette smoker     Cough     Dysconjugate gaze     Dysfunction of eustachian tube     Dyslipidemia     Dysmenorrhea     Fasting hyperglycemia     GERD (gastroesophageal reflux disease)     Hand pain, left     Hemorrhoids     Hx of abnormal cervical Pap smear     Hypertension     Irregular menses     Leg pain, bilateral     Nodule of upper lobe of right lung     BENIGN    Paresthesia of both hands     Post-menopausal     RAD (reactive " airway disease)     Skin lesions 06/2007    B9,L BACK,L HIP    Strabismus     Trigger finger of right thumb     Vertigo     Viral syndrome      Past Surgical History:   Procedure Laterality Date    CARPAL TUNNEL RELEASE Right 08/2014    COLONOSCOPY      ENDOSCOPY  07/21/2006    [facility] B9;DR LOZANO     Family History   Problem Relation Age of Onset    Breast cancer Mother         mid 30's, passed at 38    Breast cancer Daughter         early 30's, BRCA positive    Diabetes Other     Heart disease Other     Dementia Other     COPD Other      Immunization History   Administered Date(s) Administered    COVID-19 (PFIZER) Purple Cap Monovalent 08/26/2021, 09/27/2021    Pneumococcal Polysaccharide (PPSV23) 02/21/2014, 10/06/2020       Office Visit on 10/23/2023   Component Date Value Ref Range Status    WBC 10/23/2023 11.4 (H)  3.4 - 10.8 x10E3/uL Final    RBC 10/23/2023 4.81  3.77 - 5.28 x10E6/uL Final    Hemoglobin 10/23/2023 14.3  11.1 - 15.9 g/dL Final    Hematocrit 10/23/2023 43.4  34.0 - 46.6 % Final    MCV 10/23/2023 90  79 - 97 fL Final    MCH 10/23/2023 29.7  26.6 - 33.0 pg Final    MCHC 10/23/2023 32.9  31.5 - 35.7 g/dL Final    RDW 10/23/2023 12.5  11.7 - 15.4 % Final    Platelets 10/23/2023 374  150 - 450 x10E3/uL Final    Neutrophil Rel % 10/23/2023 65  Not Estab. % Final    Lymphocyte Rel % 10/23/2023 27  Not Estab. % Final    Monocyte Rel % 10/23/2023 5  Not Estab. % Final    Eosinophil Rel % 10/23/2023 2  Not Estab. % Final    Basophil Rel % 10/23/2023 1  Not Estab. % Final    Neutrophils Absolute 10/23/2023 7.5 (H)  1.4 - 7.0 x10E3/uL Final    Lymphocytes Absolute 10/23/2023 3.0  0.7 - 3.1 x10E3/uL Final    Monocytes Absolute 10/23/2023 0.6  0.1 - 0.9 x10E3/uL Final    Eosinophils Absolute 10/23/2023 0.2  0.0 - 0.4 x10E3/uL Final    Basophils Absolute 10/23/2023 0.1  0.0 - 0.2 x10E3/uL Final    Immature Granulocyte Rel % 10/23/2023 0  Not Estab. % Final    Immature Grans Absolute 10/23/2023  0.0  0.0 - 0.1 x10E3/uL Final    Glucose 10/23/2023 103 (H)  70 - 99 mg/dL Final    BUN 10/23/2023 15  8 - 27 mg/dL Final    Creatinine 10/23/2023 0.75  0.57 - 1.00 mg/dL Final    EGFR Result 10/23/2023 91  >59 mL/min/1.73 Final    BUN/Creatinine Ratio 10/23/2023 20  12 - 28 Final    Sodium 10/23/2023 145 (H)  134 - 144 mmol/L Final    Potassium 10/23/2023 5.1  3.5 - 5.2 mmol/L Final    Chloride 10/23/2023 106  96 - 106 mmol/L Final    Total CO2 10/23/2023 23  20 - 29 mmol/L Final    Calcium 10/23/2023 10.1  8.7 - 10.3 mg/dL Final    Total Protein 10/23/2023 7.5  6.0 - 8.5 g/dL Final    Albumin 10/23/2023 4.7  3.9 - 4.9 g/dL Final    Globulin 10/23/2023 2.8  1.5 - 4.5 g/dL Final    A/G Ratio 10/23/2023 1.7  1.2 - 2.2 Final    Total Bilirubin 10/23/2023 0.2  0.0 - 1.2 mg/dL Final    Alkaline Phosphatase 10/23/2023 120  44 - 121 IU/L Final    AST (SGOT) 10/23/2023 19  0 - 40 IU/L Final    ALT (SGPT) 10/23/2023 12  0 - 32 IU/L Final    Hemoglobin A1C 10/23/2023 5.8 (H)  4.8 - 5.6 % Final    Comment:          Prediabetes: 5.7 - 6.4           Diabetes: >6.4           Glycemic control for adults with diabetes: <7.0      Total Cholesterol 10/23/2023 238 (H)  100 - 199 mg/dL Final    Triglycerides 10/23/2023 78  0 - 149 mg/dL Final    HDL Cholesterol 10/23/2023 55  >39 mg/dL Final    VLDL Cholesterol Denis 10/23/2023 13  5 - 40 mg/dL Final    LDL Chol Calc (NIH) 10/23/2023 170 (H)  0 - 99 mg/dL Final    LDL/HDL RATIO 10/23/2023 3.1  0.0 - 3.2 ratio Final    Comment:                                     LDL/HDL Ratio                                              Men  Women                                1/2 Avg.Risk  1.0    1.5                                    Avg.Risk  3.6    3.2                                 2X Avg.Risk  6.2    5.0                                 3X Avg.Risk  8.0    6.1           Review of Systems   Constitutional: Negative.    HENT: Negative.     Respiratory: Negative.     Cardiovascular: Negative.     Gastrointestinal: Negative.    Genitourinary: Negative.    Musculoskeletal: Negative.    Skin: Negative.    Neurological: Negative.    Psychiatric/Behavioral: Negative.         Objective   Physical Exam  Constitutional:       Appearance: Normal appearance.   HENT:      Head: Normocephalic.   Cardiovascular:      Rate and Rhythm: Normal rate and regular rhythm.      Pulses: Normal pulses.      Heart sounds: Normal heart sounds.   Pulmonary:      Effort: Pulmonary effort is normal.      Breath sounds: Normal breath sounds.   Abdominal:      General: Bowel sounds are normal.   Musculoskeletal:         General: Normal range of motion.   Skin:     General: Skin is warm.   Neurological:      General: No focal deficit present.      Mental Status: She is alert and oriented to person, place, and time.   Psychiatric:         Mood and Affect: Mood normal.         Behavior: Behavior normal.         Procedures    Assessment & Plan   Diagnoses and all orders for this visit:    1. Dyslipidemia (Primary)  -     Lipid Panel With LDL / HDL Ratio    2. Fasting hyperglycemia  -     Comprehensive Metabolic Panel  -     Hemoglobin A1c    3. Preventative health care  -     CBC & Differential  -     TSH+Free T4  -     T3    4. BMI 24.0-24.9, adult    5. Cigarette smoker           Current Outpatient Medications:     albuterol sulfate HFA (Ventolin HFA) 108 (90 Base) MCG/ACT inhaler, Inhale 2 puffs Every 4 (Four) Hours As Needed for Wheezing., Disp: 18 g, Rfl: 6    azithromycin (Zithromax Z-Tom) 250 MG tablet, Take 2 tablets the first day, then 1 tablet daily for 4 days., Disp: 6 tablet, Rfl: 0    cyclobenzaprine (FLEXERIL) 10 MG tablet, Take 1 tablet by mouth 3 (Three) Times a Day As Needed for Muscle Spasms., Disp: 30 tablet, Rfl: 2    fosinopril (MONOPRIL) 40 MG tablet, TAKE 1 TABLET BY MOUTH DAILY, Disp: 90 tablet, Rfl: 1    gemfibrozil (LOPID) 600 MG tablet, TAKE 1 TABLET BY MOUTH TWICE DAILY, Disp: 180 tablet, Rfl: 1    hydrOXYzine  pamoate (VISTARIL) 50 MG capsule, Take 1 capsule by mouth 3 (Three) Times a Day As Needed for Itching., Disp: 120 capsule, Rfl: 2    ibuprofen (ADVIL,MOTRIN) 800 MG tablet, TAKE 1 TABLET BY MOUTH EVERY 8 HOURS AS NEEDED FOR MILD PAIN, Disp: 30 tablet, Rfl: 3    ipratropium (ATROVENT) 0.02 % nebulizer solution, Take 2.5 mL by nebulization 4 (Four) Times a Day., Disp: 360 mL, Rfl: 12    metoprolol succinate XL (TOPROL-XL) 25 MG 24 hr tablet, TAKE 1 TABLET BY MOUTH DAILY, Disp: 90 tablet, Rfl: 1    pantoprazole (PROTONIX) 40 MG EC tablet, TAKE 1 TABLET BY MOUTH DAILY, Disp: 90 tablet, Rfl: 0    vitamin B-6 (PYRIDOXINE) 50 MG tablet, Take 1 tablet by mouth Daily., Disp: , Rfl:     vitamin C (ASCORBIC ACID) 250 MG tablet, Take 2 tablets by mouth Daily., Disp: , Rfl:     vitamin E 400 UNIT capsule, Take 1 capsule by mouth Daily., Disp: , Rfl:     Wal-phed 30 MG tablet, TAKE 1 TABLET BY MOUTH EVERY 4 HOURS AS NEEDED FOR CONGESTION, Disp: 60 tablet, Rfl: 2           BILLY Faith 4/29/2024 08:29 EDT  This note has been electronically signed

## 2024-04-30 LAB
ALBUMIN SERPL-MCNC: 4.7 G/DL (ref 3.9–4.9)
ALBUMIN/GLOB SERPL: 1.9 {RATIO} (ref 1.2–2.2)
ALP SERPL-CCNC: 107 IU/L (ref 44–121)
ALT SERPL-CCNC: 12 IU/L (ref 0–32)
AST SERPL-CCNC: 17 IU/L (ref 0–40)
BASOPHILS # BLD AUTO: 0.1 X10E3/UL (ref 0–0.2)
BASOPHILS NFR BLD AUTO: 1 %
BILIRUB SERPL-MCNC: <0.2 MG/DL (ref 0–1.2)
BUN SERPL-MCNC: 16 MG/DL (ref 8–27)
BUN/CREAT SERPL: 19 (ref 12–28)
CALCIUM SERPL-MCNC: 10.3 MG/DL (ref 8.7–10.3)
CHLORIDE SERPL-SCNC: 107 MMOL/L (ref 96–106)
CHOLEST SERPL-MCNC: 234 MG/DL (ref 100–199)
CO2 SERPL-SCNC: 22 MMOL/L (ref 20–29)
CREAT SERPL-MCNC: 0.84 MG/DL (ref 0.57–1)
EGFRCR SERPLBLD CKD-EPI 2021: 79 ML/MIN/1.73
EOSINOPHIL # BLD AUTO: 0.2 X10E3/UL (ref 0–0.4)
EOSINOPHIL NFR BLD AUTO: 2 %
ERYTHROCYTE [DISTWIDTH] IN BLOOD BY AUTOMATED COUNT: 12.7 % (ref 11.7–15.4)
GLOBULIN SER CALC-MCNC: 2.5 G/DL (ref 1.5–4.5)
GLUCOSE SERPL-MCNC: 102 MG/DL (ref 70–99)
HBA1C MFR BLD: 5.8 % (ref 4.8–5.6)
HCT VFR BLD AUTO: 41.4 % (ref 34–46.6)
HDLC SERPL-MCNC: 62 MG/DL
HGB BLD-MCNC: 14 G/DL (ref 11.1–15.9)
IMM GRANULOCYTES # BLD AUTO: 0 X10E3/UL (ref 0–0.1)
IMM GRANULOCYTES NFR BLD AUTO: 0 %
LDLC SERPL CALC-MCNC: 162 MG/DL (ref 0–99)
LDLC/HDLC SERPL: 2.6 RATIO (ref 0–3.2)
LYMPHOCYTES # BLD AUTO: 2.8 X10E3/UL (ref 0.7–3.1)
LYMPHOCYTES NFR BLD AUTO: 31 %
MCH RBC QN AUTO: 30.1 PG (ref 26.6–33)
MCHC RBC AUTO-ENTMCNC: 33.8 G/DL (ref 31.5–35.7)
MCV RBC AUTO: 89 FL (ref 79–97)
MONOCYTES # BLD AUTO: 0.7 X10E3/UL (ref 0.1–0.9)
MONOCYTES NFR BLD AUTO: 8 %
NEUTROPHILS # BLD AUTO: 5.4 X10E3/UL (ref 1.4–7)
NEUTROPHILS NFR BLD AUTO: 58 %
PLATELET # BLD AUTO: 444 X10E3/UL (ref 150–450)
POTASSIUM SERPL-SCNC: 5 MMOL/L (ref 3.5–5.2)
PROT SERPL-MCNC: 7.2 G/DL (ref 6–8.5)
RBC # BLD AUTO: 4.65 X10E6/UL (ref 3.77–5.28)
SODIUM SERPL-SCNC: 143 MMOL/L (ref 134–144)
T3 SERPL-MCNC: 117 NG/DL (ref 71–180)
T4 FREE SERPL-MCNC: 1.28 NG/DL (ref 0.82–1.77)
TRIGL SERPL-MCNC: 59 MG/DL (ref 0–149)
TSH SERPL DL<=0.005 MIU/L-ACNC: 3.02 UIU/ML (ref 0.45–4.5)
VLDLC SERPL CALC-MCNC: 10 MG/DL (ref 5–40)
WBC # BLD AUTO: 9.2 X10E3/UL (ref 3.4–10.8)

## 2024-06-10 RX ORDER — PANTOPRAZOLE SODIUM 40 MG/1
40 TABLET, DELAYED RELEASE ORAL DAILY
Qty: 90 TABLET | Refills: 0 | Status: SHIPPED | OUTPATIENT
Start: 2024-06-10

## 2024-07-29 ENCOUNTER — OFFICE VISIT (OUTPATIENT)
Dept: FAMILY MEDICINE CLINIC | Facility: CLINIC | Age: 64
End: 2024-07-29
Payer: OTHER GOVERNMENT

## 2024-07-29 VITALS
DIASTOLIC BLOOD PRESSURE: 83 MMHG | OXYGEN SATURATION: 96 % | TEMPERATURE: 97.9 F | HEIGHT: 60 IN | WEIGHT: 124 LBS | HEART RATE: 98 BPM | BODY MASS INDEX: 24.35 KG/M2 | SYSTOLIC BLOOD PRESSURE: 137 MMHG

## 2024-07-29 DIAGNOSIS — J06.9 UPPER RESPIRATORY TRACT INFECTION, UNSPECIFIED TYPE: Primary | ICD-10-CM

## 2024-07-29 DIAGNOSIS — R05.1 ACUTE COUGH: ICD-10-CM

## 2024-07-29 PROBLEM — M54.41 ACUTE RIGHT-SIDED LOW BACK PAIN WITH RIGHT-SIDED SCIATICA: Status: RESOLVED | Noted: 2022-04-14 | Resolved: 2024-07-29

## 2024-07-29 PROBLEM — J01.90 ACUTE SINUSITIS: Status: RESOLVED | Noted: 2018-01-04 | Resolved: 2024-07-29

## 2024-07-29 PROBLEM — Z13.1 SCREENING FOR DIABETES MELLITUS: Status: RESOLVED | Noted: 2018-01-04 | Resolved: 2024-07-29

## 2024-07-29 PROCEDURE — 99213 OFFICE O/P EST LOW 20 MIN: CPT | Performed by: NURSE PRACTITIONER

## 2024-07-29 RX ORDER — BROMPHENIRAMINE MALEATE, PSEUDOEPHEDRINE HYDROCHLORIDE, AND DEXTROMETHORPHAN HYDROBROMIDE 2; 30; 10 MG/5ML; MG/5ML; MG/5ML
5 SYRUP ORAL 3 TIMES DAILY PRN
Qty: 100 ML | Refills: 0 | Status: SHIPPED | OUTPATIENT
Start: 2024-07-29

## 2024-07-29 RX ORDER — AZITHROMYCIN 250 MG/1
TABLET, FILM COATED ORAL
Qty: 6 TABLET | Refills: 0 | Status: SHIPPED | OUTPATIENT
Start: 2024-07-29 | End: 2024-08-03

## 2024-07-29 NOTE — PROGRESS NOTES
"Chief Complaint  Nasal Congestion and Earache        Nimco Mccracken presents to [facility] INTERNAL MEDICINE        Subjective      64 year old female patient of Juliet Marcos presents today with acute c/o URI symptoms.  Medical history of hypertension, PUD, vertigo, GERD, anxiety.    For a week or so now with cough and congestion. Cough is clear to white at times. She has been using nebulizer on occasion. Taking Robitussin DM, Vicks Vapor Rub, ibuprofen for ear pain. Appetite off, small portions. Cough is worse at night.                     Objective         Vital Signs:     /83 (BP Location: Right arm, Patient Position: Sitting, Cuff Size: Adult)   Pulse 98   Temp 97.9 °F (36.6 °C) (Infrared)   Ht 152.4 cm (60\")   Wt 56.2 kg (124 lb)   SpO2 96%   BMI 24.22 kg/m²       Physical Exam  Vitals reviewed.   Constitutional:       Appearance: She is well-developed.      Comments:      HENT:      Head: Normocephalic and atraumatic.      Right Ear: Hearing, tympanic membrane, ear canal and external ear normal.      Left Ear: Tympanic membrane, ear canal and external ear normal.      Nose: Congestion and rhinorrhea present. Rhinorrhea is purulent.      Right Sinus: Maxillary sinus tenderness present. No frontal sinus tenderness.      Left Sinus: Maxillary sinus tenderness present. No frontal sinus tenderness.      Mouth/Throat:      Mouth: Mucous membranes are moist.      Pharynx: Oropharynx is clear. Posterior oropharyngeal erythema present. No oropharyngeal exudate.   Eyes:      Conjunctiva/sclera: Conjunctivae normal.   Cardiovascular:      Rate and Rhythm: Normal rate and regular rhythm.      Pulses: Normal pulses.      Heart sounds: Normal heart sounds.   Pulmonary:      Effort: Pulmonary effort is normal. No respiratory distress.      Breath sounds: Normal breath sounds. No stridor. No wheezing, rhonchi or rales.   Chest:      Chest wall: No tenderness.   Musculoskeletal:         " General: Normal range of motion.      Cervical back: Normal range of motion.   Skin:     General: Skin is warm and dry.      Findings: No rash.   Neurological:      Mental Status: She is alert and oriented to person, place, and time.   Psychiatric:         Behavior: Behavior normal.                History of Present Illness      Patient Active Problem List   Diagnosis    Abnormal cytological finding in specimen from cervix uteri    Acquired trigger finger    Allergic rhinitis due to pollen    Anxiety    Body aches    Carpal tunnel syndrome    Cigarette smoker    Cough    Dyslipidemia    Dysmenorrhea    Eustachian tube dysfunction    Fasting hyperglycemia    Gastroesophageal reflux disease    Hand pain, left    Hand paresthesia    Hemorrhoids    Hypertension    Leg pain, bilateral    Nonspecific syndrome suggestive of viral illness    Reactive airways dysfunction syndrome    Upper respiratory infection, acute    Vertigo    Overweight for height    BMI 24.0-24.9, adult         Past Medical History:   Diagnosis Date    Acute right-sided low back pain with right-sided sciatica 04/14/2022    Acute sinusitis     Acute upper respiratory infection     Allergic rhinitis due to pollen     Anxiety     BMI 24.0-24.9, adult 10/06/2021    Body aches     Carpal tunnel syndrome     Cigarette smoker     Cough     Dysconjugate gaze     Dysfunction of eustachian tube     Dyslipidemia     Dysmenorrhea     Fasting hyperglycemia     GERD (gastroesophageal reflux disease)     Hand pain, left     Hemorrhoids     Hx of abnormal cervical Pap smear     Hypertension     Irregular menses     Leg pain, bilateral     Nodule of upper lobe of right lung     BENIGN    Paresthesia of both hands     Post-menopausal     RAD (reactive airway disease)     Skin lesions 06/2007    B9,L BACK,L HIP    Strabismus     Trigger finger of right thumb     Vertigo     Viral syndrome           Family History   Problem Relation Age of Onset    Breast cancer Mother          mid 30's, passed at 38    Breast cancer Daughter         early 30's, BRCA positive    Diabetes Other     Heart disease Other     Dementia Other     COPD Other           Past Surgical History:   Procedure Laterality Date    CARPAL TUNNEL RELEASE Right 08/2014    COLONOSCOPY      ENDOSCOPY  07/21/2006    [facility] B9;DR LOZ[facility]          Social History     Socioeconomic History    Marital status:    Tobacco Use    Smoking status: Every Day     Current packs/day: 0.50     Average packs/day: 0.5 packs/day for 46.6 years (23.3 ttl pk-yrs)     Types: Cigarettes     Start date: 1978     Passive exposure: Current    Smokeless tobacco: Never   Vaping Use    Vaping status: Never Used   Substance and Sexual Activity    Alcohol use: Never    Drug use: Never    Sexual activity: Defer                    Result Review :                                  BMI is within normal parameters. No other follow-up for BMI required.               Assessment and Plan      Diagnoses and all orders for this visit:    1. Upper respiratory tract infection, unspecified type (Primary)  -     brompheniramine-pseudoephedrine-DM 30-2-10 MG/5ML syrup; Take 5 mL by mouth 3 (Three) Times a Day As Needed for Cough or Congestion.  Dispense: 100 mL; Refill: 0  -     azithromycin (Zithromax Z-Tom) 250 MG tablet; Take 2 tablets the first day, then 1 tablet daily for 4 days.  Dispense: 6 tablet; Refill: 0    2. Acute cough        Will treat patient with a Z-Tom and Bromfed-DM.  Advised not to take any other over-the-counter cough medication.  She does have a history of sinusitis ETD restrictive airway dysfunction                  Follow Up       No follow-ups on file.      Patient was given instructions and counseling regarding her condition or for health maintenance advice. Please see specific information pulled into the AVS if appropriate.     Nelli Hussein, APRN7/29/202415:56 EDT  This note has been electronically signed

## 2024-09-18 RX ORDER — GEMFIBROZIL 600 MG/1
600 TABLET, FILM COATED ORAL 2 TIMES DAILY
Qty: 180 TABLET | Refills: 1 | Status: SHIPPED | OUTPATIENT
Start: 2024-09-18

## 2024-09-18 RX ORDER — METOPROLOL SUCCINATE 25 MG/1
25 TABLET, EXTENDED RELEASE ORAL DAILY
Qty: 90 TABLET | Refills: 1 | Status: SHIPPED | OUTPATIENT
Start: 2024-09-18

## 2024-09-24 RX ORDER — PANTOPRAZOLE SODIUM 40 MG/1
40 TABLET, DELAYED RELEASE ORAL DAILY
Qty: 90 TABLET | Refills: 0 | Status: SHIPPED | OUTPATIENT
Start: 2024-09-24

## 2024-10-07 ENCOUNTER — TELEPHONE (OUTPATIENT)
Dept: FAMILY MEDICINE CLINIC | Facility: CLINIC | Age: 64
End: 2024-10-07
Payer: OTHER GOVERNMENT

## 2024-10-15 ENCOUNTER — TELEPHONE (OUTPATIENT)
Dept: FAMILY MEDICINE CLINIC | Facility: CLINIC | Age: 64
End: 2024-10-15

## 2024-10-15 DIAGNOSIS — Z78.0 POST-MENOPAUSAL: ICD-10-CM

## 2024-10-15 DIAGNOSIS — Z12.31 OTHER SCREENING MAMMOGRAM: Primary | ICD-10-CM

## 2024-10-15 NOTE — TELEPHONE ENCOUNTER
Caller: Nimco Mccracken    Relationship: Self    Best call back number: [phone]     What orders are you requesting (i.e. lab or imaging): MAMMOGRAM AND DEXA SCAN     In what timeframe would the patient need to come in: ASAP    Where will you receive your lab/imaging services: [facility]    Additional notes:       THESE ARE ROUTINE

## 2024-10-21 ENCOUNTER — TELEPHONE (OUTPATIENT)
Dept: FAMILY MEDICINE CLINIC | Facility: CLINIC | Age: 64
End: 2024-10-21

## 2024-10-21 DIAGNOSIS — E78.5 DYSLIPIDEMIA: Primary | ICD-10-CM

## 2024-10-21 DIAGNOSIS — Z00.00 PREVENTATIVE HEALTH CARE: ICD-10-CM

## 2024-10-21 DIAGNOSIS — R73.01 FASTING HYPERGLYCEMIA: ICD-10-CM

## 2024-10-21 NOTE — TELEPHONE ENCOUNTER
Caller: Nimco Mccracken    Relationship: Self    Best call back number: [phone]     What orders are you requesting (i.e. lab or imaging): LABS    In what timeframe would the patient need to come in: PATIENT WANTS TO SCHEDULE ON 11/15 AT 8:00AM    Where will you receive your lab/imaging services:  OFFICE    Additional notes: PATIENT IS REQUESTING LABS PRIOR TO HER FOLLOW UP APPOINTMENT ON 11/18. PLEASE CALL PATIENT TO SCHEDULE WHEN LABS HAVE BEEN ORDERED.

## 2024-11-18 ENCOUNTER — OFFICE VISIT (OUTPATIENT)
Dept: FAMILY MEDICINE CLINIC | Facility: CLINIC | Age: 64
End: 2024-11-18
Payer: OTHER GOVERNMENT

## 2024-11-18 VITALS
SYSTOLIC BLOOD PRESSURE: 116 MMHG | TEMPERATURE: 96.9 F | HEART RATE: 82 BPM | BODY MASS INDEX: 23.75 KG/M2 | OXYGEN SATURATION: 98 % | WEIGHT: 121 LBS | DIASTOLIC BLOOD PRESSURE: 68 MMHG | HEIGHT: 60 IN

## 2024-11-18 DIAGNOSIS — M79.672 LEFT FOOT PAIN: Primary | ICD-10-CM

## 2024-11-18 DIAGNOSIS — F17.210 CIGARETTE SMOKER: ICD-10-CM

## 2024-11-18 PROCEDURE — 99213 OFFICE O/P EST LOW 20 MIN: CPT | Performed by: NURSE PRACTITIONER

## 2024-11-18 RX ORDER — FOSINOPRIL SODIUM 40 MG/1
40 TABLET ORAL DAILY
Qty: 90 TABLET | Refills: 1 | Status: SHIPPED | OUTPATIENT
Start: 2024-11-18

## 2024-11-18 NOTE — PROGRESS NOTES
"    Nimco Mccracken is a 64 y.o. female.     History of Present Illness  64-year-old white female smoker with history of COPD, hypertension, carpal tunnel, GERD, anemia, anxiety and lumbar disc disease who comes in today for 6-month follow-up visit    Blood pressure 116/68 heart rate 82 she denies any chest pain, dyspnea, tachycardia or dizziness    Patient's only complaint is about a month ago she was sitting on a stool went to get down and when she stood up her left ankle twisted she heard a pop and now she has a visible knot on the outer aspect top of her foot.  Will get an x-ray and go from there it is still painful to walk on.    Her sugars have been very well-controlled her last A1c was 5.7 with mildly elevated lipid panel.  She just did labs and they were stable    Weight is 121 with a BMI 23.6.  She has had 2 COVID vaccines is up-to-date on eye exam.  Her mammogram and DEXA scan are scheduled this month and will be due again in November 2025 her next colonoscopy is due in 2028            Left foot x-ray  Wear orthotic and shoes till we know what is going on with your foot  Monitor weight try not to lose anymore  Keep appointment for mammogram and bone scan  Stop smoking  Follow-up 6 months         The following portions of the patient's history were reviewed and updated as appropriate: allergies, current medications, past family history, past medical history, past social history, past surgical history, and problem list.    Vitals:    11/18/24 1529   BP: 116/68   BP Location: Right arm   Patient Position: Sitting   Cuff Size: Adult   Pulse: 82   Temp: 96.9 °F (36.1 °C)   TempSrc: Infrared   SpO2: 98%   Weight: 54.9 kg (121 lb)   Height: 152.4 cm (60\")       Past Medical History:   Diagnosis Date    Acute right-sided low back pain with right-sided sciatica 04/14/2022    Acute sinusitis     Acute upper respiratory infection     Allergic rhinitis due to pollen     Anxiety     BMI 24.0-24.9, adult 10/06/2021 "    Body aches     Carpal tunnel syndrome     Cigarette smoker     Cough     Dysconjugate gaze     Dysfunction of eustachian tube     Dyslipidemia     Dysmenorrhea     Fasting hyperglycemia     GERD (gastroesophageal reflux disease)     Hand pain, left     Hemorrhoids     Hx of abnormal cervical Pap smear     Hypertension     Irregular menses     Leg pain, bilateral     Nodule of upper lobe of right lung     BENIGN    Paresthesia of both hands     Post-menopausal     RAD (reactive airway disease)     Skin lesions 06/2007    B9,L BACK,L HIP    Strabismus     Trigger finger of right thumb     Vertigo     Viral syndrome      Past Surgical History:   Procedure Laterality Date    CARPAL TUNNEL RELEASE Right 08/2014    COLONOSCOPY      ENDOSCOPY  07/21/2006    [facility] B9;DR LOZ[facility]     Family History   Problem Relation Age of Onset    Breast cancer Mother         mid 30's, passed at 38    Breast cancer Daughter         early 30's, BRCA positive    Diabetes Other     Heart disease Other     Dementia Other     COPD Other      Immunization History   Administered Date(s) Administered    COVID-19 (PFIZER) Purple Cap Monovalent 08/26/2021, 09/27/2021    Pneumococcal Polysaccharide (PPSV23) 02/21/2014, 10/06/2020       Results Encounter on 10/26/2024   Component Date Value Ref Range Status    Hemoglobin A1C 11/15/2024 5.7 (H)  4.8 - 5.6 % Final    Comment:          Prediabetes: 5.7 - 6.4           Diabetes: >6.4           Glycemic control for adults with diabetes: <7.0      Total Cholesterol 11/15/2024 239 (H)  100 - 199 mg/dL Final    Triglycerides 11/15/2024 49  0 - 149 mg/dL Final    HDL Cholesterol 11/15/2024 61  >39 mg/dL Final    VLDL Cholesterol Denis 11/15/2024 8  5 - 40 mg/dL Final    LDL Chol Calc (NIH) 11/15/2024 170 (H)  0 - 99 mg/dL Final    LDL/HDL RATIO 11/15/2024 2.8  0.0 - 3.2 ratio Final    Comment:                                     LDL/HDL Ratio                                              Men   Women                                1/2 Avg.Risk  1.0    1.5                                    Avg.Risk  3.6    3.2                                 2X Avg.Risk  6.2    5.0                                 3X Avg.Risk  8.0    6.1      WBC 11/15/2024 9.3  3.4 - 10.8 x10E3/uL Final    RBC 11/15/2024 4.71  3.77 - 5.28 x10E6/uL Final    Hemoglobin 11/15/2024 13.8  11.1 - 15.9 g/dL Final    Hematocrit 11/15/2024 42.4  34.0 - 46.6 % Final    MCV 11/15/2024 90  79 - 97 fL Final    MCH 11/15/2024 29.3  26.6 - 33.0 pg Final    MCHC 11/15/2024 32.5  31.5 - 35.7 g/dL Final    RDW 11/15/2024 12.4  11.7 - 15.4 % Final    Platelets 11/15/2024 410  150 - 450 x10E3/uL Final    Neutrophil Rel % 11/15/2024 54  Not Estab. % Final    Lymphocyte Rel % 11/15/2024 36  Not Estab. % Final    Monocyte Rel % 11/15/2024 7  Not Estab. % Final    Eosinophil Rel % 11/15/2024 2  Not Estab. % Final    Basophil Rel % 11/15/2024 1  Not Estab. % Final    Neutrophils Absolute 11/15/2024 5.1  1.4 - 7.0 x10E3/uL Final    Lymphocytes Absolute 11/15/2024 3.4 (H)  0.7 - 3.1 x10E3/uL Final    Monocytes Absolute 11/15/2024 0.7  0.1 - 0.9 x10E3/uL Final    Eosinophils Absolute 11/15/2024 0.2  0.0 - 0.4 x10E3/uL Final    Basophils Absolute 11/15/2024 0.1  0.0 - 0.2 x10E3/uL Final    Immature Granulocyte Rel % 11/15/2024 0  Not Estab. % Final    Immature Grans Absolute 11/15/2024 0.0  0.0 - 0.1 x10E3/uL Final    Glucose 11/15/2024 88  70 - 99 mg/dL Final    BUN 11/15/2024 14  8 - 27 mg/dL Final    Creatinine 11/15/2024 0.79  0.57 - 1.00 mg/dL Final    EGFR Result 11/15/2024 85  >59 mL/min/1.73 Final    BUN/Creatinine Ratio 11/15/2024 18  12 - 28 Final    Sodium 11/15/2024 142  134 - 144 mmol/L Final    Potassium 11/15/2024 5.0  3.5 - 5.2 mmol/L Final    Chloride 11/15/2024 105  96 - 106 mmol/L Final    Total CO2 11/15/2024 24  20 - 29 mmol/L Final    Calcium 11/15/2024 10.2  8.7 - 10.3 mg/dL Final    Total Protein 11/15/2024 7.3  6.0 - 8.5 g/dL Final    Albumin  11/15/2024 4.5  3.9 - 4.9 g/dL Final    Globulin 11/15/2024 2.8  1.5 - 4.5 g/dL Final    Total Bilirubin 11/15/2024 0.3  0.0 - 1.2 mg/dL Final    Alkaline Phosphatase 11/15/2024 119  44 - 121 IU/L Final    AST (SGOT) 11/15/2024 18  0 - 40 IU/L Final    ALT (SGPT) 11/15/2024 11  0 - 32 IU/L Final         Review of Systems   Constitutional: Negative.    HENT: Negative.     Respiratory: Negative.     Cardiovascular: Negative.    Gastrointestinal: Negative.    Genitourinary: Negative.    Musculoskeletal:         Left foot pain   Skin: Negative.    Neurological: Negative.    Psychiatric/Behavioral: Negative.         Objective   Physical Exam  Constitutional:       Appearance: Normal appearance.   HENT:      Head: Normocephalic.   Cardiovascular:      Rate and Rhythm: Normal rate and regular rhythm.      Pulses: Normal pulses.      Heart sounds: Normal heart sounds.   Pulmonary:      Effort: Pulmonary effort is normal.      Breath sounds: Normal breath sounds.   Abdominal:      General: Bowel sounds are normal.   Musculoskeletal:      Comments: Visible knot on the outer aspect top of left foot with pain on ambulation   Skin:     General: Skin is warm.   Neurological:      General: No focal deficit present.      Mental Status: She is alert and oriented to person, place, and time.   Psychiatric:         Mood and Affect: Mood normal.         Behavior: Behavior normal.         Procedures    Assessment & Plan   Diagnoses and all orders for this visit:    1. Left foot pain (Primary)  -     XR Foot 3+ View Left; Future    2. BMI 23.0-23.9, adult    3. Cigarette smoker    Other orders  -     fosinopril (MONOPRIL) 40 MG tablet; Take 1 tablet by mouth Daily.  Dispense: 90 tablet; Refill: 1           Current Outpatient Medications:     albuterol sulfate HFA (Ventolin HFA) 108 (90 Base) MCG/ACT inhaler, Inhale 2 puffs Every 4 (Four) Hours As Needed for Wheezing., Disp: 18 g, Rfl: 6    brompheniramine-pseudoephedrine-DM 30-2-10 MG/5ML  syrup, Take 5 mL by mouth 3 (Three) Times a Day As Needed for Cough or Congestion., Disp: 100 mL, Rfl: 0    cyclobenzaprine (FLEXERIL) 10 MG tablet, Take 1 tablet by mouth 3 (Three) Times a Day As Needed for Muscle Spasms., Disp: 30 tablet, Rfl: 2    fosinopril (MONOPRIL) 40 MG tablet, Take 1 tablet by mouth Daily., Disp: 90 tablet, Rfl: 1    gemfibrozil (LOPID) 600 MG tablet, Take 1 tablet by mouth 2 (Two) Times a Day., Disp: 180 tablet, Rfl: 1    ibuprofen (ADVIL,MOTRIN) 800 MG tablet, TAKE 1 TABLET BY MOUTH EVERY 8 HOURS AS NEEDED FOR MILD PAIN, Disp: 30 tablet, Rfl: 3    ipratropium (ATROVENT) 0.02 % nebulizer solution, Take 2.5 mL by nebulization 4 (Four) Times a Day., Disp: 360 mL, Rfl: 12    metoprolol succinate XL (TOPROL-XL) 25 MG 24 hr tablet, Take 1 tablet by mouth Daily., Disp: 90 tablet, Rfl: 1    pantoprazole (PROTONIX) 40 MG EC tablet, Take 1 tablet by mouth Daily., Disp: 90 tablet, Rfl: 0    vitamin B-6 (PYRIDOXINE) 50 MG tablet, Take 1 tablet by mouth Daily., Disp: , Rfl:     vitamin C (ASCORBIC ACID) 250 MG tablet, Take 2 tablets by mouth Daily., Disp: , Rfl:     vitamin E 400 UNIT capsule, Take 1 capsule by mouth Daily., Disp: , Rfl:            Juliet Marcos, APRN 11/18/2024 15:50 EST  This note has been electronically signed   Yes

## 2024-11-18 NOTE — PATIENT INSTRUCTIONS
Left foot x-ray  Wear orthotic and shoes till we know what is going on with your foot  Monitor weight try not to lose anymore  Keep appointment for mammogram and bone scan  Stop smoking  Follow-up 6 months

## 2024-11-19 ENCOUNTER — TELEPHONE (OUTPATIENT)
Dept: FAMILY MEDICINE CLINIC | Facility: CLINIC | Age: 64
End: 2024-11-19
Payer: OTHER GOVERNMENT

## 2024-11-19 DIAGNOSIS — M79.672 LEFT FOOT PAIN: ICD-10-CM

## 2024-11-19 NOTE — TELEPHONE ENCOUNTER
I'm also sending mychart msg to pt    RELAY  X-ray does not show any fracture so not quite sure what that noticed.  Would probably recommend a podiatry referral either Dr mejia in [place] on [address] or dr ryan in [place]    And    Labs are stable except LDL is still high at 170 and she is on Lopid twice a day.  Need to make sure she is actually taking it twice a day if so should probably switch to fenofibrate to see if it will work better

## 2024-11-25 ENCOUNTER — HOSPITAL ENCOUNTER (OUTPATIENT)
Dept: MAMMOGRAPHY | Facility: HOSPITAL | Age: 64
Discharge: HOME | End: 2024-11-25
Payer: OTHER GOVERNMENT

## 2024-11-25 ENCOUNTER — HOSPITAL ENCOUNTER (OUTPATIENT)
Dept: BONE DENSITY | Facility: HOSPITAL | Age: 64
Discharge: HOME | End: 2024-11-25
Payer: OTHER GOVERNMENT

## 2024-11-25 DIAGNOSIS — Z12.31 OTHER SCREENING MAMMOGRAM: ICD-10-CM

## 2024-11-25 DIAGNOSIS — Z78.0 POST-MENOPAUSAL: ICD-10-CM

## 2024-11-25 PROCEDURE — 77080 DXA BONE DENSITY AXIAL: CPT

## 2024-11-25 PROCEDURE — 77063 BREAST TOMOSYNTHESIS BI: CPT

## 2024-11-25 PROCEDURE — 77067 SCR MAMMO BI INCL CAD: CPT

## 2024-12-09 ENCOUNTER — OFFICE VISIT (OUTPATIENT)
Dept: FAMILY MEDICINE CLINIC | Facility: CLINIC | Age: 64
End: 2024-12-09
Payer: OTHER GOVERNMENT

## 2024-12-09 VITALS
HEIGHT: 60 IN | TEMPERATURE: 97.3 F | HEART RATE: 93 BPM | DIASTOLIC BLOOD PRESSURE: 64 MMHG | WEIGHT: 120 LBS | BODY MASS INDEX: 23.56 KG/M2 | OXYGEN SATURATION: 98 % | SYSTOLIC BLOOD PRESSURE: 106 MMHG

## 2024-12-09 DIAGNOSIS — R05.9 COUGH, UNSPECIFIED TYPE: Primary | ICD-10-CM

## 2024-12-09 DIAGNOSIS — J06.9 UPPER RESPIRATORY TRACT INFECTION, UNSPECIFIED TYPE: ICD-10-CM

## 2024-12-09 DIAGNOSIS — U07.1 COVID-19: ICD-10-CM

## 2024-12-09 LAB
EXPIRATION DATE: ABNORMAL
FLUAV AG UPPER RESP QL IA.RAPID: NOT DETECTED
FLUBV AG UPPER RESP QL IA.RAPID: NOT DETECTED
INTERNAL CONTROL: ABNORMAL
Lab: ABNORMAL
SARS-COV-2 AG UPPER RESP QL IA.RAPID: DETECTED

## 2024-12-09 PROCEDURE — 99213 OFFICE O/P EST LOW 20 MIN: CPT | Performed by: NURSE PRACTITIONER

## 2024-12-09 PROCEDURE — 87428 SARSCOV & INF VIR A&B AG IA: CPT | Performed by: NURSE PRACTITIONER

## 2024-12-09 RX ORDER — BROMPHENIRAMINE MALEATE, PSEUDOEPHEDRINE HYDROCHLORIDE, AND DEXTROMETHORPHAN HYDROBROMIDE 2; 30; 10 MG/5ML; MG/5ML; MG/5ML
5 SYRUP ORAL 3 TIMES DAILY PRN
Qty: 100 ML | Refills: 0 | Status: SHIPPED | OUTPATIENT
Start: 2024-12-09

## 2024-12-09 NOTE — PATIENT INSTRUCTIONS
Allergy medication and cough syrup as directed  Quarantine as discussed and wear mask around her  with frequent handwashing  Call if no improvement

## 2024-12-09 NOTE — PROGRESS NOTES
"    Nimco Mccracken is a 64 y.o. female.     History of Present Illness  64-year-old white female smoker with history of COPD, hypertension, carpal tunnel, GERD, anemia, anxiety and lumbar disc disease who comes in today with 2-day history of cough mild fatigue and sinus issues.  She tested positive for COVID.  She states her symptoms are so mild she does not want to do Paxlovid we did discuss quarantine times and wearing a mask at home around her  and handwashing.  She is also on allergy medication and has some cough syrup at home    Weight is 125 BMI 23.4.  She has had 2 COVID-vaccine she is up-to-date on eye exam.  Her mammogram and DEXA scan will be due in November 2025.  Her next colonoscopy is in 2028            Allergy medication and cough syrup as directed  Quarantine as discussed and wear mask around her  with frequent handwashing  Call if no improvement       The following portions of the patient's history were reviewed and updated as appropriate: allergies, current medications, past family history, past medical history, past social history, past surgical history, and problem list.    Vitals:    12/09/24 1539   BP: 106/64   BP Location: Right arm   Patient Position: Sitting   Cuff Size: Adult   Pulse: 93   Temp: 97.3 °F (36.3 °C)   TempSrc: Infrared   SpO2: 98%   Weight: 54.4 kg (120 lb)   Height: 152.4 cm (60\")       Past Medical History:   Diagnosis Date    Acute right-sided low back pain with right-sided sciatica 04/14/2022    Acute sinusitis     Acute upper respiratory infection     Allergic rhinitis due to pollen     Anxiety     BMI 24.0-24.9, adult 10/06/2021    Body aches     Carpal tunnel syndrome     Cigarette smoker     Cough     Dysconjugate gaze     Dysfunction of eustachian tube     Dyslipidemia     Dysmenorrhea     Fasting hyperglycemia     GERD (gastroesophageal reflux disease)     Hand pain, left     Hemorrhoids     Hx of abnormal cervical Pap smear     Hypertension     " Irregular menses     Leg pain, bilateral     Nodule of upper lobe of right lung     BENIGN    Paresthesia of both hands     Post-menopausal     RAD (reactive airway disease)     Skin lesions 06/2007    B9,L BACK,L HIP    Strabismus     Trigger finger of right thumb     Vertigo     Viral syndrome      Past Surgical History:   Procedure Laterality Date    CARPAL TUNNEL RELEASE Right 08/2014    COLONOSCOPY      ENDOSCOPY  07/21/2006    DR [facility] B9;DR LOZANO     Family History   Problem Relation Age of Onset    Breast cancer Mother         mid 30's, passed at 38    Breast cancer Daughter         early 30's, BRCA positive    Diabetes Other     Heart disease Other     Dementia Other     COPD Other      Immunization History   Administered Date(s) Administered    COVID-19 (PFIZER) Purple Cap Monovalent 08/26/2021, 09/27/2021    Pneumococcal Polysaccharide (PPSV23) 02/21/2014, 10/06/2020       Results Encounter on 10/26/2024   Component Date Value Ref Range Status    Hemoglobin A1C 11/15/2024 5.7 (H)  4.8 - 5.6 % Final    Comment:          Prediabetes: 5.7 - 6.4           Diabetes: >6.4           Glycemic control for adults with diabetes: <7.0      Total Cholesterol 11/15/2024 239 (H)  100 - 199 mg/dL Final    Triglycerides 11/15/2024 49  0 - 149 mg/dL Final    HDL Cholesterol 11/15/2024 61  >39 mg/dL Final    VLDL Cholesterol Denis 11/15/2024 8  5 - 40 mg/dL Final    LDL Chol Calc (RUST) 11/15/2024 170 (H)  0 - 99 mg/dL Final    LDL/HDL RATIO 11/15/2024 2.8  0.0 - 3.2 ratio Final    Comment:                                     LDL/HDL Ratio                                              Men  Women                                1/2 Avg.Risk  1.0    1.5                                    Avg.Risk  3.6    3.2                                 2X Avg.Risk  6.2    5.0                                 3X Avg.Risk  8.0    6.1      WBC 11/15/2024 9.3  3.4 - 10.8 x10E3/uL Final    RBC 11/15/2024 4.71  3.77 - 5.28 x10E6/uL Final     Hemoglobin 11/15/2024 13.8  11.1 - 15.9 g/dL Final    Hematocrit 11/15/2024 42.4  34.0 - 46.6 % Final    MCV 11/15/2024 90  79 - 97 fL Final    MCH 11/15/2024 29.3  26.6 - 33.0 pg Final    MCHC 11/15/2024 32.5  31.5 - 35.7 g/dL Final    RDW 11/15/2024 12.4  11.7 - 15.4 % Final    Platelets 11/15/2024 410  150 - 450 x10E3/uL Final    Neutrophil Rel % 11/15/2024 54  Not Estab. % Final    Lymphocyte Rel % 11/15/2024 36  Not Estab. % Final    Monocyte Rel % 11/15/2024 7  Not Estab. % Final    Eosinophil Rel % 11/15/2024 2  Not Estab. % Final    Basophil Rel % 11/15/2024 1  Not Estab. % Final    Neutrophils Absolute 11/15/2024 5.1  1.4 - 7.0 x10E3/uL Final    Lymphocytes Absolute 11/15/2024 3.4 (H)  0.7 - 3.1 x10E3/uL Final    Monocytes Absolute 11/15/2024 0.7  0.1 - 0.9 x10E3/uL Final    Eosinophils Absolute 11/15/2024 0.2  0.0 - 0.4 x10E3/uL Final    Basophils Absolute 11/15/2024 0.1  0.0 - 0.2 x10E3/uL Final    Immature Granulocyte Rel % 11/15/2024 0  Not Estab. % Final    Immature Grans Absolute 11/15/2024 0.0  0.0 - 0.1 x10E3/uL Final    Glucose 11/15/2024 88  70 - 99 mg/dL Final    BUN 11/15/2024 14  8 - 27 mg/dL Final    Creatinine 11/15/2024 0.79  0.57 - 1.00 mg/dL Final    EGFR Result 11/15/2024 85  >59 mL/min/1.73 Final    BUN/Creatinine Ratio 11/15/2024 18  12 - 28 Final    Sodium 11/15/2024 142  134 - 144 mmol/L Final    Potassium 11/15/2024 5.0  3.5 - 5.2 mmol/L Final    Chloride 11/15/2024 105  96 - 106 mmol/L Final    Total CO2 11/15/2024 24  20 - 29 mmol/L Final    Calcium 11/15/2024 10.2  8.7 - 10.3 mg/dL Final    Total Protein 11/15/2024 7.3  6.0 - 8.5 g/dL Final    Albumin 11/15/2024 4.5  3.9 - 4.9 g/dL Final    Globulin 11/15/2024 2.8  1.5 - 4.5 g/dL Final    Total Bilirubin 11/15/2024 0.3  0.0 - 1.2 mg/dL Final    Alkaline Phosphatase 11/15/2024 119  44 - 121 IU/L Final    AST (SGOT) 11/15/2024 18  0 - 40 IU/L Final    ALT (SGPT) 11/15/2024 11  0 - 32 IU/L Final         Review of Systems    Constitutional:  Positive for fatigue.   HENT:  Positive for sinus pressure.    Respiratory:  Positive for cough.    Cardiovascular: Negative.    Gastrointestinal: Negative.    Genitourinary: Negative.    Musculoskeletal: Negative.    Skin: Negative.    Neurological: Negative.    Psychiatric/Behavioral: Negative.         Objective   Physical Exam  Constitutional:       Appearance: Normal appearance.   HENT:      Head: Normocephalic.      Mouth/Throat:      Comments: Postnasal drip  Cardiovascular:      Rate and Rhythm: Normal rate and regular rhythm.      Pulses: Normal pulses.      Heart sounds: Normal heart sounds.   Pulmonary:      Effort: Pulmonary effort is normal.      Breath sounds: Normal breath sounds.   Abdominal:      General: Bowel sounds are normal.   Musculoskeletal:         General: Normal range of motion.   Skin:     General: Skin is warm.   Neurological:      General: No focal deficit present.      Mental Status: She is alert and oriented to person, place, and time.   Psychiatric:         Mood and Affect: Mood normal.         Behavior: Behavior normal.         Procedures    Assessment & Plan   Diagnoses and all orders for this visit:    1. Cough, unspecified type (Primary)  -     POCT SARS-CoV-2 + Flu Antigen JOSEF    2. Upper respiratory tract infection, unspecified type  -     brompheniramine-pseudoephedrine-DM 30-2-10 MG/5ML syrup; Take 5 mL by mouth 3 (Three) Times a Day As Needed for Cough or Congestion.  Dispense: 100 mL; Refill: 0    3. COVID-19           Current Outpatient Medications:     albuterol sulfate HFA (Ventolin HFA) 108 (90 Base) MCG/ACT inhaler, Inhale 2 puffs Every 4 (Four) Hours As Needed for Wheezing., Disp: 18 g, Rfl: 6    brompheniramine-pseudoephedrine-DM 30-2-10 MG/5ML syrup, Take 5 mL by mouth 3 (Three) Times a Day As Needed for Cough or Congestion., Disp: 100 mL, Rfl: 0    cyclobenzaprine (FLEXERIL) 10 MG tablet, Take 1 tablet by mouth 3 (Three) Times a Day As Needed for  Muscle Spasms., Disp: 30 tablet, Rfl: 2    fosinopril (MONOPRIL) 40 MG tablet, Take 1 tablet by mouth Daily., Disp: 90 tablet, Rfl: 1    gemfibrozil (LOPID) 600 MG tablet, Take 1 tablet by mouth 2 (Two) Times a Day., Disp: 180 tablet, Rfl: 1    ibuprofen (ADVIL,MOTRIN) 800 MG tablet, TAKE 1 TABLET BY MOUTH EVERY 8 HOURS AS NEEDED FOR MILD PAIN, Disp: 30 tablet, Rfl: 3    ipratropium (ATROVENT) 0.02 % nebulizer solution, Take 2.5 mL by nebulization 4 (Four) Times a Day., Disp: 360 mL, Rfl: 12    metoprolol succinate XL (TOPROL-XL) 25 MG 24 hr tablet, Take 1 tablet by mouth Daily., Disp: 90 tablet, Rfl: 1    pantoprazole (PROTONIX) 40 MG EC tablet, Take 1 tablet by mouth Daily., Disp: 90 tablet, Rfl: 0    vitamin B-6 (PYRIDOXINE) 50 MG tablet, Take 1 tablet by mouth Daily., Disp: , Rfl:     vitamin C (ASCORBIC ACID) 250 MG tablet, Take 2 tablets by mouth Daily., Disp: , Rfl:     vitamin E 400 UNIT capsule, Take 1 capsule by mouth Daily., Disp: , Rfl:            BILLY Faith 12/9/2024 16:18 EST  This note has been electronically signed

## 2024-12-23 ENCOUNTER — TELEPHONE (OUTPATIENT)
Dept: FAMILY MEDICINE CLINIC | Facility: CLINIC | Age: 64
End: 2024-12-23

## 2024-12-23 NOTE — TELEPHONE ENCOUNTER
Caller: Nimco Mccracken    Relationship: Self    Best call back number: [phone]     What medication are you requesting: ANTIBIOTIC    What are your current symptoms: CLOGGED EAR,BLOOD IN NOSE IN MORNING,SINUS PRESSURE,HEAD CONGESTION    How long have you been experiencing symptoms: 2 WEEKS    Have you had these symptoms before:    [x] Yes  [] No    Have you been treated for these symptoms before:   [x] Yes  [] No    If a prescription is needed, what is your preferred pharmacy and phone number:    [id number] - [place], [place] - [address] AT [facility] - [phone]  - [phone]    Additional notes:  PATIENT JUST GOT OVER COVID AND NOW HAS THESES SYMPTOMS

## 2025-01-14 RX ORDER — PANTOPRAZOLE SODIUM 40 MG/1
40 TABLET, DELAYED RELEASE ORAL DAILY
Qty: 90 TABLET | Refills: 0 | Status: SHIPPED | OUTPATIENT
Start: 2025-01-14

## 2025-03-10 ENCOUNTER — OFFICE VISIT (OUTPATIENT)
Dept: FAMILY MEDICINE CLINIC | Facility: CLINIC | Age: 65
End: 2025-03-10
Payer: OTHER GOVERNMENT

## 2025-03-10 VITALS
BODY MASS INDEX: 23.75 KG/M2 | SYSTOLIC BLOOD PRESSURE: 131 MMHG | DIASTOLIC BLOOD PRESSURE: 75 MMHG | RESPIRATION RATE: 16 BRPM | OXYGEN SATURATION: 98 % | WEIGHT: 121 LBS | HEIGHT: 60 IN | HEART RATE: 95 BPM | TEMPERATURE: 97.3 F

## 2025-03-10 DIAGNOSIS — R93.6 ABNORMAL X-RAY OF SHOULDER: ICD-10-CM

## 2025-03-10 DIAGNOSIS — M25.511 ACUTE PAIN OF RIGHT SHOULDER: Primary | ICD-10-CM

## 2025-03-10 PROCEDURE — 99213 OFFICE O/P EST LOW 20 MIN: CPT | Performed by: NURSE PRACTITIONER

## 2025-03-10 RX ORDER — ALBUTEROL SULFATE 90 UG/1
2 INHALANT RESPIRATORY (INHALATION) EVERY 4 HOURS PRN
Qty: 18 G | Refills: 6 | Status: SHIPPED | OUTPATIENT
Start: 2025-03-10

## 2025-03-10 NOTE — PROGRESS NOTES
"    Nimco Mccracken is a 64 y.o. female.     History of Present Illness  64-year-old white female smoker with history of COPD, hypertension, carpal tunnel, GERD, anemia, anxiety lumbar disc disease who comes in today with complaint of 2-week history of right shoulder pain.  She states very painful when using it and at rest depending on how much she is used to it.  She denies any injury or overuse.  There is a large knot in the front of her shoulder.  Will get an x-ray and probable MRI depending on x-ray results.  Told her to take 800 ibuprofen 3 times a day with food until we figure out what is going on.  I encouraged her to try not to use her shoulder but I am not sure if she can do that    Blood pressure 130/74 heart rate 94 she denies any chest pain, dyspnea, tachycardia or dizziness       Weight is 121 with a BMI of 23.6.  She has had 2 COVID vaccines up-to-date on eye exam.  Mammogram and DEXA scan are due in November 2025 her next colonoscopy is due in 2028          Right shoulder x-ray  Probable MRI  Ibuprofen 800 3 times daily with food  Try to limit use for shoulder       The following portions of the patient's history were reviewed and updated as appropriate: allergies, current medications, past family history, past medical history, past social history, past surgical history, and problem list.    Vitals:    03/10/25 1340   BP: 131/75   BP Location: Left arm   Patient Position: Sitting   Cuff Size: Adult   Pulse: 95   Resp: 16   Temp: 97.3 °F (36.3 °C)   SpO2: 98%   Weight: 54.9 kg (121 lb)   Height: 152.4 cm (60\")       Past Medical History:   Diagnosis Date    Acute right-sided low back pain with right-sided sciatica 04/14/2022    Acute sinusitis     Acute upper respiratory infection     Allergic rhinitis due to pollen     Anxiety     BMI 24.0-24.9, adult 10/06/2021    Body aches     Carpal tunnel syndrome     Cigarette smoker     Cough     Dysconjugate gaze     Dysfunction of eustachian tube     " Dyslipidemia     Dysmenorrhea     Fasting hyperglycemia     GERD (gastroesophageal reflux disease)     Hand pain, left     Hemorrhoids     Hx of abnormal cervical Pap smear     Hypertension     Irregular menses     Leg pain, bilateral     Nodule of upper lobe of right lung     BENIGN    Paresthesia of both hands     Post-menopausal     RAD (reactive airway disease)     Skin lesions 06/2007    B9,L BACK,L HIP    Strabismus     Trigger finger of right thumb     Vertigo     Viral syndrome      Past Surgical History:   Procedure Laterality Date    CARPAL TUNNEL RELEASE Right 08/2014    COLONOSCOPY      ENDOSCOPY  07/21/2006    DR [facility] B9;DR LOZANO     Family History   Problem Relation Age of Onset    Breast cancer Mother         mid 30's, passed at 38    Breast cancer Daughter         early 30's, BRCA positive    Diabetes Other     Heart disease Other     Dementia Other     COPD Other      Immunization History   Administered Date(s) Administered    COVID-19 (PFIZER) Purple Cap Monovalent 08/26/2021, 09/27/2021    Pneumococcal Polysaccharide (PPSV23) 02/21/2014, 10/06/2020       Office Visit on 12/09/2024   Component Date Value Ref Range Status    SARS Antigen 12/09/2024 Detected (A)  Not Detected, Presumptive Negative Final    Influenza A Antigen JOSEF 12/09/2024 Not Detected  Not Detected Final    Influenza B Antigen JOSEF 12/09/2024 Not Detected  Not Detected Final    Internal Control 12/09/2024 Passed  Passed Final    Lot Number 12/09/2024 [id number]   Final    Expiration Date 12/09/2024 10,[id number]   Final         Review of Systems   Constitutional: Negative.    HENT: Negative.     Respiratory: Negative.     Cardiovascular: Negative.    Gastrointestinal: Negative.    Genitourinary: Negative.    Musculoskeletal:         Right shoulder pain   Skin: Negative.    Neurological: Negative.    Psychiatric/Behavioral: Negative.         Objective   Physical Exam  Constitutional:       Appearance: Normal appearance.    HENT:      Head: Normocephalic.   Cardiovascular:      Rate and Rhythm: Normal rate and regular rhythm.      Pulses: Normal pulses.      Heart sounds: Normal heart sounds.   Pulmonary:      Effort: Pulmonary effort is normal.      Breath sounds: Normal breath sounds.   Abdominal:      General: Bowel sounds are normal.   Musculoskeletal:      Comments: Right shoulder pain with limited amount of motion without inducing pain.  Bony aspect front of the shoulder larger than normal   Skin:     General: Skin is warm.   Neurological:      General: No focal deficit present.      Mental Status: She is alert and oriented to person, place, and time.   Psychiatric:         Mood and Affect: Mood normal.         Behavior: Behavior normal.         Procedures    Assessment & Plan   Diagnoses and all orders for this visit:    1. Acute pain of right shoulder (Primary)  -     XR Shoulder 2+ View Right; Future           Current Outpatient Medications:     albuterol sulfate HFA (Ventolin HFA) 108 (90 Base) MCG/ACT inhaler, Inhale 2 puffs Every 4 (Four) Hours As Needed for Wheezing., Disp: 18 g, Rfl: 6    brompheniramine-pseudoephedrine-DM 30-2-10 MG/5ML syrup, Take 5 mL by mouth 3 (Three) Times a Day As Needed for Cough or Congestion., Disp: 100 mL, Rfl: 0    cyclobenzaprine (FLEXERIL) 10 MG tablet, Take 1 tablet by mouth 3 (Three) Times a Day As Needed for Muscle Spasms., Disp: 30 tablet, Rfl: 2    fosinopril (MONOPRIL) 40 MG tablet, Take 1 tablet by mouth Daily., Disp: 90 tablet, Rfl: 1    gemfibrozil (LOPID) 600 MG tablet, Take 1 tablet by mouth 2 (Two) Times a Day., Disp: 180 tablet, Rfl: 1    ibuprofen (ADVIL,MOTRIN) 800 MG tablet, TAKE 1 TABLET BY MOUTH EVERY 8 HOURS AS NEEDED FOR MILD PAIN, Disp: 30 tablet, Rfl: 3    ipratropium (ATROVENT) 0.02 % nebulizer solution, Take 2.5 mL by nebulization 4 (Four) Times a Day., Disp: 360 mL, Rfl: 12    metoprolol succinate XL (TOPROL-XL) 25 MG 24 hr tablet, Take 1 tablet by mouth Daily.,  Disp: 90 tablet, Rfl: 1    pantoprazole (PROTONIX) 40 MG EC tablet, TAKE 1 TABLET BY MOUTH DAILY, Disp: 90 tablet, Rfl: 0    vitamin B-6 (PYRIDOXINE) 50 MG tablet, Take 1 tablet by mouth Daily., Disp: , Rfl:     vitamin C (ASCORBIC ACID) 250 MG tablet, Take 2 tablets by mouth Daily., Disp: , Rfl:     vitamin E 400 UNIT capsule, Take 1 capsule by mouth Daily., Disp: , Rfl:            BILLY Faith 3/10/2025 13:56 EDT  This note has been electronically signed

## 2025-03-10 NOTE — PATIENT INSTRUCTIONS
Right shoulder x-ray  Probable MRI  Ibuprofen 800 3 times daily with food  Try to limit use for shoulder

## 2025-03-17 ENCOUNTER — TELEPHONE (OUTPATIENT)
Dept: FAMILY MEDICINE CLINIC | Facility: CLINIC | Age: 65
End: 2025-03-17

## 2025-03-17 RX ORDER — DIAZEPAM 5 MG/1
TABLET ORAL
Qty: 1 TABLET | Refills: 0 | Status: SHIPPED | OUTPATIENT
Start: 2025-03-17

## 2025-03-17 NOTE — TELEPHONE ENCOUNTER
Caller: Nimco Mccracken    Relationship: Self    Best call back number: [phone]     What medication are you requesting: SOMETHING FOR ANXIETY FOR MRI  ANXIETY  What are your current symptoms:     How long have you been experiencing symptoms:     Have you had these symptoms before:    [] Yes  [] No    Have you been treated for these symptoms before:   [] Yes  [] No    If a prescription is needed, what is your preferred pharmacy and phone number: [id number] - [place], [place] - [address] AT [facility] - [phone] [facility] [phone] FX     Additional notes: MRI IS SCHEDULED FOR 3/24/25.

## 2025-03-17 NOTE — TELEPHONE ENCOUNTER
Caller: Nimco Mccracken    Relationship: Self    Best call back number: [phone]    What orders are you requesting (i.e. lab or imaging): ORDERS WITH THE MRI, TO BE SEDATED     In what timeframe would the patient need to come in: SHE IS SCHEDULED FOR THE MRI 3/24/25    Where will you receive your lab/imaging services: [facility]    Additional notes:  WOULD LIKE SEDATION PREFERABLY TO A VALIUM, ETC

## 2025-03-18 RX ORDER — METOPROLOL SUCCINATE 25 MG/1
25 TABLET, EXTENDED RELEASE ORAL DAILY
Qty: 90 TABLET | Refills: 1 | Status: SHIPPED | OUTPATIENT
Start: 2025-03-18

## 2025-03-18 RX ORDER — GEMFIBROZIL 600 MG/1
600 TABLET, FILM COATED ORAL 2 TIMES DAILY
Qty: 180 TABLET | Refills: 1 | Status: SHIPPED | OUTPATIENT
Start: 2025-03-18

## 2025-03-24 RX ORDER — PANTOPRAZOLE SODIUM 40 MG/1
40 TABLET, DELAYED RELEASE ORAL DAILY
Qty: 90 TABLET | Refills: 0 | Status: SHIPPED | OUTPATIENT
Start: 2025-03-24

## 2025-04-07 ENCOUNTER — OFFICE VISIT (OUTPATIENT)
Dept: ORTHOPEDIC SURGERY | Facility: CLINIC | Age: 65
End: 2025-04-07
Payer: OTHER GOVERNMENT

## 2025-04-07 VITALS — BODY MASS INDEX: 23.75 KG/M2 | HEIGHT: 60 IN | OXYGEN SATURATION: 94 % | WEIGHT: 121 LBS

## 2025-04-07 DIAGNOSIS — M75.91 RIGHT SUPRASPINATUS TENDINITIS: ICD-10-CM

## 2025-04-07 DIAGNOSIS — M75.31 CALCIFIC TENDINITIS OF RIGHT SHOULDER: Primary | ICD-10-CM

## 2025-04-07 DIAGNOSIS — M25.511 ACUTE PAIN OF RIGHT SHOULDER: ICD-10-CM

## 2025-04-07 PROCEDURE — 20610 DRAIN/INJ JOINT/BURSA W/O US: CPT | Performed by: PHYSICIAN ASSISTANT

## 2025-04-07 PROCEDURE — 99203 OFFICE O/P NEW LOW 30 MIN: CPT | Performed by: PHYSICIAN ASSISTANT

## 2025-04-07 RX ORDER — LIDOCAINE HYDROCHLORIDE 10 MG/ML
2 INJECTION, SOLUTION EPIDURAL; INFILTRATION; INTRACAUDAL; PERINEURAL
Status: COMPLETED | OUTPATIENT
Start: 2025-04-07 | End: 2025-04-07

## 2025-04-07 RX ORDER — TRIAMCINOLONE ACETONIDE 40 MG/ML
80 INJECTION, SUSPENSION INTRA-ARTICULAR; INTRAMUSCULAR
Status: COMPLETED | OUTPATIENT
Start: 2025-04-07 | End: 2025-04-07

## 2025-04-07 RX ADMIN — LIDOCAINE HYDROCHLORIDE 2 ML: 10 INJECTION, SOLUTION EPIDURAL; INFILTRATION; INTRACAUDAL; PERINEURAL at 11:01

## 2025-04-07 RX ADMIN — TRIAMCINOLONE ACETONIDE 80 MG: 40 INJECTION, SUSPENSION INTRA-ARTICULAR; INTRAMUSCULAR at 11:01
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
## 2025-04-09 ENCOUNTER — PATIENT ROUNDING (BHMG ONLY) (OUTPATIENT)
Dept: ORTHOPEDIC SURGERY | Facility: CLINIC | Age: 65
End: 2025-04-09
Payer: OTHER GOVERNMENT

## 2025-06-02 ENCOUNTER — OFFICE VISIT (OUTPATIENT)
Dept: FAMILY MEDICINE CLINIC | Facility: CLINIC | Age: 65
End: 2025-06-02
Payer: OTHER GOVERNMENT

## 2025-06-02 VITALS
WEIGHT: 119.4 LBS | OXYGEN SATURATION: 99 % | HEIGHT: 60 IN | SYSTOLIC BLOOD PRESSURE: 122 MMHG | DIASTOLIC BLOOD PRESSURE: 71 MMHG | HEART RATE: 86 BPM | TEMPERATURE: 97.1 F | RESPIRATION RATE: 16 BRPM | BODY MASS INDEX: 23.44 KG/M2

## 2025-06-02 DIAGNOSIS — R53.83 OTHER FATIGUE: ICD-10-CM

## 2025-06-02 DIAGNOSIS — F17.210 CIGARETTE SMOKER: ICD-10-CM

## 2025-06-02 DIAGNOSIS — R19.7 DIARRHEA, UNSPECIFIED TYPE: ICD-10-CM

## 2025-06-02 DIAGNOSIS — M75.51 ACUTE BURSITIS OF RIGHT SHOULDER: ICD-10-CM

## 2025-06-02 DIAGNOSIS — R73.01 FASTING HYPERGLYCEMIA: ICD-10-CM

## 2025-06-02 DIAGNOSIS — Z00.00 PREVENTATIVE HEALTH CARE: ICD-10-CM

## 2025-06-02 DIAGNOSIS — E78.5 DYSLIPIDEMIA: ICD-10-CM

## 2025-06-02 DIAGNOSIS — Z12.31 ENCOUNTER FOR SCREENING MAMMOGRAM FOR MALIGNANT NEOPLASM OF BREAST: ICD-10-CM

## 2025-06-02 DIAGNOSIS — Z12.2 SCREENING FOR LUNG CANCER: Primary | ICD-10-CM

## 2025-06-02 PROCEDURE — 99214 OFFICE O/P EST MOD 30 MIN: CPT | Performed by: NURSE PRACTITIONER

## 2025-06-02 RX ORDER — DICYCLOMINE HYDROCHLORIDE 10 MG/1
CAPSULE ORAL
Qty: 60 CAPSULE | Refills: 0 | Status: SHIPPED | OUTPATIENT
Start: 2025-06-02

## 2025-06-02 RX ORDER — ROSUVASTATIN CALCIUM 10 MG/1
10 TABLET, COATED ORAL DAILY
Qty: 90 TABLET | Refills: 0 | Status: SHIPPED | OUTPATIENT
Start: 2025-06-02 | End: 2025-06-03

## 2025-06-02 RX ORDER — FOSINOPRIL SODIUM 40 MG/1
40 TABLET ORAL DAILY
Qty: 90 TABLET | Refills: 1 | Status: SHIPPED | OUTPATIENT
Start: 2025-06-02

## 2025-06-02 NOTE — PATIENT INSTRUCTIONS
Fasting blood work  Crestor 10 mg at bedtime  Screening CT lung of chest  Mammogram at [facility]  Bentyl 10 mg as needed to control diarrhea  Keep all appointments with Ortho  Follow-up 6 months

## 2025-06-02 NOTE — PROGRESS NOTES
"    Nimco Mccracken is a 64 y.o. female.     History of Present Illness  64-year-old white female smoker with history of COPD, hypertension, carpal tunnel, GERD, anemia, anxiety, lumbar disc disease and chronic right shoulder pain who comes in today for follow-up visit fasting blood work    Blood pressure 122/70 heart rate 86 she denies any chest pain, dyspnea, tachycardia or dizziness    Patient states she is having intermittent diarrhea after eating which is more likely related to her cholecystectomy.  Going to order her a trial of Bentyl see if she can control bowel movements better    Patient has been a longtime smoker we will order a screening lung CT at [facility]    On last visit patient having pretty severe right shoulder pain she has gone to Ortho and had injections and states this helped a great deal and was told to come back if the pain returns    Patient's labs are pretty stable mildly elevated A1c and LDL.  I am going to place her on a statin at bedtime    Weight is down 3 pounds at 119 with a BMI 23.3.  She has had 2 COVID-vaccine she is up-to-date on eye exam.  Mammogram is due in October which she will schedule at [facility].  Her next DEXA scan is due in October 2026.  Her next colonoscopy is due in 2028          Fasting blood work  Crestor 10 mg at bedtime  Screening CT lung of chest  Mammogram at [facility]  Bentyl 10 mg as needed to control diarrhea  Keep all appointments with Ortho  Follow-up 6 months                 The following portions of the patient's history were reviewed and updated as appropriate: allergies, current medications, past family history, past medical history, past social history, past surgical history, and problem list.    Vitals:    06/02/25 0810   BP: 122/71   BP Location: Right arm   Patient Position: Sitting   Cuff Size: Adult   Pulse: 86   Resp: 16   Temp: 97.1 °F (36.2 °C)   SpO2: 99%   Weight: 54.2 kg (119 lb 6.4 oz)   Height: 152.4 cm (60\")       Past Medical History: "   Diagnosis Date    Acute right-sided low back pain with right-sided sciatica 04/14/2022    Acute sinusitis     Acute upper respiratory infection     Allergic     Allergic rhinitis due to pollen     Anxiety     BMI 24.0-24.9, adult 10/06/2021    Body aches     Carpal tunnel syndrome     Cigarette smoker     Cough     Dysconjugate gaze     Dysfunction of eustachian tube     Dyslipidemia     Dysmenorrhea     Fasting hyperglycemia     GERD (gastroesophageal reflux disease)     Hand pain, left     Hemorrhoids     Hx of abnormal cervical Pap smear     Hypertension     Irregular menses     Leg pain, bilateral     Nodule of upper lobe of right lung     BENIGN    Paresthesia of both hands     Post-menopausal     RAD (reactive airway disease)     Skin lesions 06/2007    B9,L BACK,L HIP    Strabismus     Trigger finger of right thumb     Vertigo     Viral syndrome      Past Surgical History:   Procedure Laterality Date    CARPAL TUNNEL RELEASE Right 08/2014    CHOLECYSTECTOMY      COLONOSCOPY      ENDOSCOPY  07/21/2006    DR [facility] B9;DR [facility]    EYE SURGERY       Family History   Problem Relation Age of Onset    Breast cancer Mother         mid 30's, passed at 38    Breast cancer Daughter         early 30's, BRCA positive    Diabetes Other     Heart disease Other     Dementia Other     COPD Other      Immunization History   Administered Date(s) Administered    COVID-19 (PFIZER) Purple Cap Monovalent 08/26/2021, 09/27/2021    Pneumococcal Polysaccharide (PPSV23) 02/21/2014, 10/06/2020       Office Visit on 12/09/2024   Component Date Value Ref Range Status    SARS Antigen 12/09/2024 Detected (A)  Not Detected, Presumptive Negative Final    Influenza A Antigen JOSEF 12/09/2024 Not Detected  Not Detected Final    Influenza B Antigen JOSEF 12/09/2024 Not Detected  Not Detected Final    Internal Control 12/09/2024 Passed  Passed Final    Lot Number 12/09/2024 [id number]   Final    Expiration Date 12/09/2024 [id number]    Final         Review of Systems   Constitutional: Negative.    HENT: Negative.     Respiratory: Negative.     Cardiovascular: Negative.    Gastrointestinal:  Positive for diarrhea.   Genitourinary: Negative.    Musculoskeletal: Negative.    Skin: Negative.    Neurological: Negative.    Psychiatric/Behavioral: Negative.         Objective   Physical Exam  Constitutional:       Appearance: Normal appearance.   HENT:      Head: Normocephalic.   Cardiovascular:      Rate and Rhythm: Normal rate and regular rhythm.      Pulses: Normal pulses.      Heart sounds: Normal heart sounds.   Pulmonary:      Effort: Pulmonary effort is normal.      Breath sounds: Normal breath sounds.   Abdominal:      General: Bowel sounds are normal.   Musculoskeletal:      Comments: Still has some mild right shoulder pain but much improved after injections   Skin:     General: Skin is warm.   Neurological:      General: No focal deficit present.      Mental Status: She is alert and oriented to person, place, and time.   Psychiatric:         Mood and Affect: Mood normal.         Behavior: Behavior normal.         Procedures    Assessment & Plan   Diagnoses and all orders for this visit:    1. Screening for lung cancer (Primary)  -      CT Chest Low Dose Cancer Screening WO; Future    2. Dyslipidemia  -     Lipid Panel With LDL / HDL Ratio    3. Fasting hyperglycemia  -     Comprehensive Metabolic Panel  -     Hemoglobin A1c    4. Preventative health care  -     CBC & Differential    5. Other fatigue  -     TSH+Free T4  -     T3    Other orders  -     rosuvastatin (Crestor) 10 MG tablet; Take 1 tablet by mouth Daily.  Dispense: 90 tablet; Refill: 0  -     fosinopril (MONOPRIL) 40 MG tablet; Take 1 tablet by mouth Daily.  Dispense: 90 tablet; Refill: 1           Current Outpatient Medications:     fosinopril (MONOPRIL) 40 MG tablet, Take 1 tablet by mouth Daily., Disp: 90 tablet, Rfl: 1    albuterol sulfate HFA (Ventolin HFA) 108 (90 Base) MCG/ACT  inhaler, Inhale 2 puffs Every 4 (Four) Hours As Needed for Wheezing., Disp: 18 g, Rfl: 6    brompheniramine-pseudoephedrine-DM 30-2-10 MG/5ML syrup, Take 5 mL by mouth 3 (Three) Times a Day As Needed for Cough or Congestion., Disp: 100 mL, Rfl: 0    gemfibrozil (LOPID) 600 MG tablet, TAKE 1 TABLET BY MOUTH TWICE DAILY, Disp: 180 tablet, Rfl: 1    ibuprofen (ADVIL,MOTRIN) 800 MG tablet, TAKE 1 TABLET BY MOUTH EVERY 8 HOURS AS NEEDED FOR MILD PAIN, Disp: 30 tablet, Rfl: 3    ipratropium (ATROVENT) 0.02 % nebulizer solution, Take 2.5 mL by nebulization 4 (Four) Times a Day., Disp: 360 mL, Rfl: 12    metoprolol succinate XL (TOPROL-XL) 25 MG 24 hr tablet, TAKE 1 TABLET BY MOUTH DAILY, Disp: 90 tablet, Rfl: 1    pantoprazole (PROTONIX) 40 MG EC tablet, TAKE 1 TABLET BY MOUTH DAILY, Disp: 90 tablet, Rfl: 0    rosuvastatin (Crestor) 10 MG tablet, Take 1 tablet by mouth Daily., Disp: 90 tablet, Rfl: 0    vitamin B-6 (PYRIDOXINE) 50 MG tablet, Take 1 tablet by mouth Daily., Disp: , Rfl:     vitamin C (ASCORBIC ACID) 250 MG tablet, Take 2 tablets by mouth Daily., Disp: , Rfl:     vitamin E 400 UNIT capsule, Take 1 capsule by mouth Daily., Disp: , Rfl:            BILLY Faith 6/2/2025 08:37 EDT  This note has been electronically signed

## 2025-06-03 LAB
ALBUMIN SERPL-MCNC: 4.5 G/DL (ref 3.9–4.9)
ALP SERPL-CCNC: 106 IU/L (ref 44–121)
ALT SERPL-CCNC: 12 IU/L (ref 0–32)
AST SERPL-CCNC: 17 IU/L (ref 0–40)
BASOPHILS # BLD AUTO: 0.1 X10E3/UL (ref 0–0.2)
BASOPHILS NFR BLD AUTO: 1 %
BILIRUB SERPL-MCNC: 0.3 MG/DL (ref 0–1.2)
BUN SERPL-MCNC: 13 MG/DL (ref 8–27)
BUN/CREAT SERPL: 18 (ref 12–28)
CALCIUM SERPL-MCNC: 10.1 MG/DL (ref 8.7–10.3)
CHLORIDE SERPL-SCNC: 106 MMOL/L (ref 96–106)
CHOLEST SERPL-MCNC: 236 MG/DL (ref 100–199)
CO2 SERPL-SCNC: 23 MMOL/L (ref 20–29)
CREAT SERPL-MCNC: 0.74 MG/DL (ref 0.57–1)
EGFRCR SERPLBLD CKD-EPI 2021: 91 ML/MIN/1.73
EOSINOPHIL # BLD AUTO: 0.2 X10E3/UL (ref 0–0.4)
EOSINOPHIL NFR BLD AUTO: 2 %
ERYTHROCYTE [DISTWIDTH] IN BLOOD BY AUTOMATED COUNT: 13 % (ref 11.7–15.4)
GLOBULIN SER CALC-MCNC: 2.4 G/DL (ref 1.5–4.5)
GLUCOSE SERPL-MCNC: 89 MG/DL (ref 70–99)
HBA1C MFR BLD: 5.6 % (ref 4.8–5.6)
HCT VFR BLD AUTO: 45.8 % (ref 34–46.6)
HDLC SERPL-MCNC: 62 MG/DL
HGB BLD-MCNC: 14.5 G/DL (ref 11.1–15.9)
IMM GRANULOCYTES # BLD AUTO: 0 X10E3/UL (ref 0–0.1)
IMM GRANULOCYTES NFR BLD AUTO: 0 %
LDLC SERPL CALC-MCNC: 159 MG/DL (ref 0–99)
LDLC/HDLC SERPL: 2.6 RATIO (ref 0–3.2)
LYMPHOCYTES # BLD AUTO: 2.8 X10E3/UL (ref 0.7–3.1)
LYMPHOCYTES NFR BLD AUTO: 24 %
MCH RBC QN AUTO: 29.5 PG (ref 26.6–33)
MCHC RBC AUTO-ENTMCNC: 31.7 G/DL (ref 31.5–35.7)
MCV RBC AUTO: 93 FL (ref 79–97)
MONOCYTES # BLD AUTO: 0.7 X10E3/UL (ref 0.1–0.9)
MONOCYTES NFR BLD AUTO: 6 %
NEUTROPHILS # BLD AUTO: 7.6 X10E3/UL (ref 1.4–7)
NEUTROPHILS NFR BLD AUTO: 67 %
PLATELET # BLD AUTO: 472 X10E3/UL (ref 150–450)
POTASSIUM SERPL-SCNC: 5.4 MMOL/L (ref 3.5–5.2)
PROT SERPL-MCNC: 6.9 G/DL (ref 6–8.5)
RBC # BLD AUTO: 4.92 X10E6/UL (ref 3.77–5.28)
SODIUM SERPL-SCNC: 143 MMOL/L (ref 134–144)
T3 SERPL-MCNC: 92 NG/DL (ref 71–180)
T4 FREE SERPL-MCNC: 1.28 NG/DL (ref 0.82–1.77)
TRIGL SERPL-MCNC: 85 MG/DL (ref 0–149)
TSH SERPL DL<=0.005 MIU/L-ACNC: 2.19 UIU/ML (ref 0.45–4.5)
VLDLC SERPL CALC-MCNC: 15 MG/DL (ref 5–40)
WBC # BLD AUTO: 11.4 X10E3/UL (ref 3.4–10.8)

## 2025-06-03 RX ORDER — ROSUVASTATIN CALCIUM 20 MG/1
20 TABLET, COATED ORAL DAILY
Qty: 90 TABLET | Refills: 1 | Status: SHIPPED | OUTPATIENT
Start: 2025-06-03

## 2025-06-17 RX ORDER — PANTOPRAZOLE SODIUM 40 MG/1
40 TABLET, DELAYED RELEASE ORAL DAILY
Qty: 90 TABLET | Refills: 0 | Status: SHIPPED | OUTPATIENT
Start: 2025-06-17

## 2025-07-07 ENCOUNTER — HOSPITAL ENCOUNTER (OUTPATIENT)
Dept: CT IMAGING | Facility: HOSPITAL | Age: 65
Discharge: HOME | End: 2025-07-07
Admitting: NURSE PRACTITIONER
Payer: OTHER GOVERNMENT

## 2025-07-07 DIAGNOSIS — F17.210 CIGARETTE SMOKER: ICD-10-CM

## 2025-07-07 DIAGNOSIS — Z12.2 SCREENING FOR LUNG CANCER: ICD-10-CM

## 2025-07-07 PROCEDURE — 71271 CT THORAX LUNG CANCER SCR C-: CPT
